# Patient Record
Sex: FEMALE | Race: WHITE | Employment: FULL TIME | URBAN - METROPOLITAN AREA
[De-identification: names, ages, dates, MRNs, and addresses within clinical notes are randomized per-mention and may not be internally consistent; named-entity substitution may affect disease eponyms.]

---

## 2020-06-15 ENCOUNTER — OFFICE VISIT (OUTPATIENT)
Dept: ORTHOPEDIC SURGERY | Age: 50
End: 2020-06-15
Payer: COMMERCIAL

## 2020-06-15 VITALS — BODY MASS INDEX: 21.21 KG/M2 | HEIGHT: 66 IN | TEMPERATURE: 98.8 F | WEIGHT: 132 LBS

## 2020-06-15 PROCEDURE — 99204 OFFICE O/P NEW MOD 45 MIN: CPT | Performed by: ORTHOPAEDIC SURGERY

## 2020-06-15 NOTE — PROGRESS NOTES
phone: None     Gets together: None     Attends Congregational service: None     Active member of club or organization: None     Attends meetings of clubs or organizations: None     Relationship status: None    Intimate partner violence     Fear of current or ex partner: None     Emotionally abused: None     Physically abused: None     Forced sexual activity: None   Other Topics Concern    None   Social History Narrative    None     No current outpatient medications on file. No current facility-administered medications for this visit. No Known Allergies    REVIEW OF SYSTEMS:   No rash  No numbness  No tingling  No fever  No depression      Pertinent items are noted in HPI  Review of systems reviewed from Patient History Form dated on 6/15/2020 and available in the patient's chart under the Media tab. Examination:    General Exam:    Vitals: Temperature 98.8 °F (37.1 °C), height 5' 6\" (1.676 m), weight 132 lb (59.9 kg). BMI Readings from Last 3 Encounters:   06/15/20 21.31 kg/m²     Constitutional: Patient is adequately groomed with no evidence of malnutrition  Mental Status: The patient is oriented to time, place and person. The patient's mood and affect are appropriate. Lymphatic: The lymphatic examination bilaterally reveals all areas to be without enlargement or induration. Vascular: Examination reveals no swelling or calf tenderness. Peripheral pulses are palpable and 2+. Neurological: The patient has good coordination. There is no weakness or sensory deficit. Skin:    Head/Neck: inspection reveals no rashes, ulcerations or lesions. Trunk: inspection reveals no rashes, ulcerations or lesions. Right Upper Extremity: inspection reveals no rashes, ulcerations or lesions. Left Upper Extremity: inspection reveals no rashes, ulcerations or lesions.         PHYSICAL EXAM:      Shoulder Examination  Inspection:  No obvious deformity, no erythema, no periscapular musculature, the trapezius musculature or the pectoralis musculature. Positive Neer's test, positive Cifuentes test, positive pain with crossarm elevation. Gait: normal gait     Reflex: Deep tendon reflexes of the biceps, triceps, brachioradialis +2/4 equal bilaterally. Lower extremity reflexes:  +2/4 and equal bilaterally for patella and Achilles. Contralateral Shoulder exam: Palpation demonstrates no swelling no effusion no pain. There is full active and passive range of motion bilaterally. Strength is excellent with internal rotation against resistance external rotation against resistance supraspinatus isolation against resistance. Shoulder shrug strength is 5 over 5 equal bilaterally. Radial ulnar and median nerve function is intact. Capillary refill is brisk. Elbow motion finger and wrist motion is full equal bilaterally. Deep tendon reflexes of the Brachial radialis, biceps, triceps are all +2/4 equal bilaterally. Cervical spine range of motion is full without pain negative Spurling's test.  Load-and-shift test is negative. Crank test is negative. Apprehension and relocation are negative. Anterior and posterior glide are equal bilaterally. Negative sulcus sign. No signs of any significant multidirectional instability. There is no scapular winging. There is no muscle atrophy of the latissimus dorsi, the deltoid, the periscapular musculature, the trapezius musculature or the pectoralis musculature. Negative Neer's test, negative Cifuentes test, no pain with crossarm elevation. Abduction --150 degrees  Flexion-- 180 degrees  Extension-- between 45-60 degrees  Latera/external rotation --close to 90 degrees  Medial/ internal rotation -- between 70-90 degree    CERVICAL SPINE  EXAMINATION:  · Inspection: Local inspection shows no step-off or bruising. Cervical alignment is normal. No instability is noted. · Palpation and Percussion: No evidence of tenderness at the midline. Examination of the neck does not show any tenderness, deformity or injury. Range of motion is unremarkable. There is no gross instability. There are no rashes, ulcerations or lesions. Strength and tone are normal.        IMPRESSION:    Diagnostic testing:  X-rays reviewed in office by myself, I personally and independently reviewed the films in the office today : I reviewed multiple X-rays today of the right shoulder:  Anterior posterior, lateral, axillary:  Show no fracture, no dislocation, no signs of any masses or tumors, no significant glenohumeral arthritis, no significant A.C. Joint arthritis, good joint space maintenance,    Impression no significant bony abnormality  MRI: MRI demonstrates full-thickness rotator cuff tear with subacromial impingement distal clavicle arthritis  Labs:None at this time. No results found. History reviewed. No pertinent surgical history. .    Office Procedures:  No orders of the defined types were placed in this encounter. Previous Treatments: MRI, physical therapy, injections,  , X-ray, anti-inflammatories,    Differential Diagnoses: Impingement, AC joint osteoarthritis, Rotator cuff tear, Labral tear, Instability, loose body, long head of bicep injury, glenohumeral osteoarthritis, AC joint separation, SLAP tear, Posterior labral tear, Anterior Labral tear, neck pathology, brachioplexis injury, muscle injury, neck radiculopathy, bone tumor, fracture or stress fracture. Diagnosis rotator cuff tear full-thickness right shoulder      Plan (Medical Decision Making):    I discussed the diagnosis and the treatment options with [facility] today. In Summary:  The various treatment options were outlined and discussed with [facility] including:  Conservative care options: physical therapy, ice, medications, bracing, and activity modification. The indications for therapeutic injections. The indications for additional imaging/laboratory studies.   The indications limited to bleeding, infection, spinal headache, nerve injury, increased pain and lack of pain relief. The patient verbalized understanding and would like to proceed. The patient will be scheduled accordingly. 5. Healthy Lifestyle Measures:  Patient education material reviewing the following was distributed to [facility]  Anatomic drawings  Healthy lifestyle education  Osteoporosis prevention,   Back and neck pain educational information   Advanced imaging preparedness    Posture education   Proper lifting and carrying techniques,  Weight management discussed  For further information regarding the spine conditions and to review interventional treatments the patient was directed to [facility].    6.  Follow up:  after surgery    [facility] was instructed to call the office if her symptoms worsen or if new symptoms appear prior to the next scheduled visit. She is specifically instructed to contact the office between now & her scheduled appointment if she has concerns related to her condition or if she needs assistance in scheduling the above tests. She is   welcome to call for an appointment sooner if she has any additional concerns or questions. Ashu Castelan D.O. [facility]. and Sports Medicine  Sports Fellowship Trained  Board Certified  [employer] Team Physician      Disclaimer: \"This note was dictated with voice recognition software. Though review and correction are routine, we apologize for any errors. \"

## 2020-06-16 ENCOUNTER — TELEPHONE (OUTPATIENT)
Dept: ORTHOPEDIC SURGERY | Age: 50
End: 2020-06-16

## 2020-06-16 RX ORDER — UBIDECARENONE 75 MG
50 CAPSULE ORAL DAILY
COMMUNITY

## 2020-06-16 NOTE — PROGRESS NOTES
There is a one visitor policy at [facility] for the pre-op phase only for surgery and endoscopy cases. The visitor is expected to leave the facility after the patient is taken back for the procedure. Pain management is NO VISITOR policyThe patients ride is expected to remain in the car with a cell phone for communication. If the ride is leaving the hospital grounds please make sure they are back in time for pickup. Have the patient inform the staff on arrival what their rides plans are while the patient is in the facility. At the MAIN there is one visitor allowed. Please note that the visitor policy is subject to change.

## 2020-06-16 NOTE — PROGRESS NOTES
Patient instructed to get their COVID-19 test done as directed by their doctor (5-7 days prior to procedure)  or patient states will get on __6/19/2020________. I The day the COVID test is done is considered day one. Instructed to self quarantine after test until DOS.

## 2020-06-16 NOTE — PROGRESS NOTES
take you home after your surgery. You will not be allowed to leave alone or drive yourself home. It is strongly suggested someone stay with you the first 24 hrs. Your surgery will be cancelled if you do not have a ride home. 8. A parent/legal guardian must accompany a child scheduled for surgery and plan to stay at the hospital until the child is discharged. Please do not bring other children with you. 9. Please wear simple, loose fitting clothing to the hospital.  Lynsey Bonilla not bring valuables (money, credit cards, checkbooks, etc.) Do not wear any makeup (including no eye makeup) or nail polish on your fingers or toes. 10. DO NOT wear any jewelry or piercings on day of surgery. All body piercing jewelry must be removed. 11. If you have ___dentures, they will be removed before going to the OR; we will provide you a container. If you wear ___contact lenses or ___glasses, they will be removed; please bring a case for them. 12. Please see your family doctor/pediatrician for a history & physical and/or concerning medications. Bring any test results/reports from your physician's office. PCP__________________Phone___________H&P Appt. Date________             13 If you  have a Living Will and Durable Power of  for Healthcare, please bring in a copy. 15. Notify your Surgeon if you develop any illness between now and surgery  time, cough, cold, fever, sore throat, nausea, vomiting, etc.  Please notify your surgeon if you experience dizziness, shortness of breath or blurred vision between now & the time of your surgery             15. DO NOT shave your operative site 96 hours prior to surgery. For face & neck surgery, men may use an electric razor 48 hours prior to surgery. 16. Shower the night before or morning of surgery using an antibacterial soap or as you have been instructed.              17. To provide excellent care visitors will be limited to one in the room at any given time. 18.  Please bring picture ID and insurance card. 19.  Visit our web site for additional information:  [email or web address]              20.During flu season no children under the age of 15 are permitted in the hospital for the safety of all patients. 21. If you take a long acting insulin in the evening only  take half of your usual  dose the night  before your procedure              22. If you use a c-pap please bring DOS if staying overnight,             23.For your convenience [facility] has a pharmacy on site to fill your prescriptions. 24. If you use oxygen and have a portable tank please bring it  with you the DOS             25. Bring a complete list of all your medications with name and dose include any supplements. 26. Other__________________________________________   *Please call pre admission testing if you any further questions   [address] [place]  [phone]   [address] [place] [address]       All above information reviewed with patient in person or by phone. Patient verbalizes understanding. All questions and concerns addressed.                                                                                                  Patient/Rep____PATIENT________________                                                                                                                                    PRE OP INSTRUCTIONS

## 2020-06-19 ENCOUNTER — OFFICE VISIT (OUTPATIENT)
Dept: PRIMARY CARE CLINIC | Age: 50
End: 2020-06-19
Payer: COMMERCIAL

## 2020-06-19 PROCEDURE — 99211 OFF/OP EST MAY X REQ PHY/QHP: CPT | Performed by: NURSE PRACTITIONER

## 2020-06-19 NOTE — PATIENT INSTRUCTIONS
hands are visibly dirty.   ; Avoid touching: Avoid touching your eyes, nose, and mouth with unwashed hands. Handwashing Tips   ; Wet your hands with clean, running water (warm or cold), turn off the tap, and apply soap.  ; Lather your hands by rubbing them together with the soap. Lather the backs of your hands, between your fingers, and under your nails. ; Scrub your hands for at least 20 seconds. Need a timer? Hum the [place] from beginning to end twice.  ; Rinse your hands well under clean, running water.  ; Dry your hands using a clean towel or air dry them. Avoid sharing personal household items   ; Do not share: You should not share dishes, drinking glasses, cups, eating utensils, towels, or bedding with other people or pets in your home.   ; Wash thoroughly after use: After using these items, they should be washed thoroughly with soap and water. Clean all high-touch surfaces everyday   ; Clean and disinfect: Practice routine cleaning of high touch surfaces.  ; High touch surfaces include counters, tabletops, doorknobs, bathroom fixtures, toilets, phones, keyboards, tablets, and bedside tables.  ; Disinfect areas with bodily fluids: Also, clean any surfaces that may have blood, stool, or body fluids on them.   ; Household : Use a household cleaning spray or wipe, according to the label instructions. Labels contain instructions for safe and effective use of the cleaning product including precautions you should take when applying the product, such as wearing gloves and making sure you have good ventilation during use of the product.     Monitor your symptoms   Seek medical attention: Seek prompt medical attention if your illness is worsening     (e.g., difficulty breathing).   ; Call your doctor: Before seeking care, call your healthcare provider and tell them that you have, or are being evaluated for, COVID-19.   ; Wear a facemask when sick: Put on a facemask before you enter the your e-mail address. You will receive e-mail notification when new information is available in [address] 9. Click Sign Up. You can now view your medical record. Additional Information  If you have questions, please contact your physician practice where you receive care. Remember, MyChart is NOT to be used for urgent needs. For medical emergencies, dial 911.

## 2020-06-20 LAB — SARS-COV-2, PCR: NOT DETECTED

## 2020-06-20 RX ORDER — PROMETHAZINE HYDROCHLORIDE 25 MG/1
12.5 TABLET ORAL EVERY 6 HOURS PRN
Status: CANCELLED | OUTPATIENT
Start: 2020-06-20

## 2020-06-20 RX ORDER — OXYCODONE HYDROCHLORIDE 10 MG/1
10 TABLET ORAL EVERY 6 HOURS PRN
Qty: 21 TABLET | Refills: 0 | Status: SHIPPED | OUTPATIENT
Start: 2020-06-20 | End: 2020-06-27

## 2020-06-20 RX ORDER — MELOXICAM 15 MG/1
15 TABLET ORAL DAILY
Qty: 90 TABLET | Refills: 3 | Status: SHIPPED | OUTPATIENT
Start: 2020-06-20

## 2020-06-20 RX ORDER — ONDANSETRON 2 MG/ML
4 INJECTION INTRAMUSCULAR; INTRAVENOUS EVERY 6 HOURS PRN
Status: CANCELLED | OUTPATIENT
Start: 2020-06-20

## 2020-06-20 RX ORDER — SENNA AND DOCUSATE SODIUM 50; 8.6 MG/1; MG/1
1 TABLET, FILM COATED ORAL 2 TIMES DAILY
Status: CANCELLED | OUTPATIENT
Start: 2020-06-20

## 2020-06-20 RX ORDER — ACETAMINOPHEN 325 MG/1
650 TABLET ORAL EVERY 6 HOURS
Status: CANCELLED | OUTPATIENT
Start: 2020-06-20

## 2020-06-23 ENCOUNTER — HOSPITAL ENCOUNTER (OUTPATIENT)
Age: 50
Setting detail: OUTPATIENT SURGERY
Discharge: HOME | End: 2020-06-23
Attending: ORTHOPAEDIC SURGERY | Admitting: ORTHOPAEDIC SURGERY
Payer: COMMERCIAL

## 2020-06-23 ENCOUNTER — ANESTHESIA (OUTPATIENT)
Dept: OPERATING ROOM | Age: 50
End: 2020-06-23
Payer: COMMERCIAL

## 2020-06-23 ENCOUNTER — ANESTHESIA EVENT (OUTPATIENT)
Dept: OPERATING ROOM | Age: 50
End: 2020-06-23
Payer: COMMERCIAL

## 2020-06-23 VITALS
DIASTOLIC BLOOD PRESSURE: 56 MMHG | OXYGEN SATURATION: 98 % | SYSTOLIC BLOOD PRESSURE: 101 MMHG | WEIGHT: 136 LBS | TEMPERATURE: 97.6 F | BODY MASS INDEX: 21.86 KG/M2 | HEART RATE: 65 BPM | RESPIRATION RATE: 16 BRPM | HEIGHT: 66 IN

## 2020-06-23 VITALS
TEMPERATURE: 95.7 F | OXYGEN SATURATION: 100 % | RESPIRATION RATE: 13 BRPM | DIASTOLIC BLOOD PRESSURE: 51 MMHG | SYSTOLIC BLOOD PRESSURE: 92 MMHG

## 2020-06-23 PROCEDURE — C1762 CONN TISS, HUMAN(INC FASCIA): HCPCS | Performed by: ORTHOPAEDIC SURGERY

## 2020-06-23 PROCEDURE — 2500000003 HC RX 250 WO HCPCS: Performed by: NURSE ANESTHETIST, CERTIFIED REGISTERED

## 2020-06-23 PROCEDURE — 2500000003 HC RX 250 WO HCPCS: Performed by: ORTHOPAEDIC SURGERY

## 2020-06-23 PROCEDURE — 3700000000 HC ANESTHESIA ATTENDED CARE: Performed by: ORTHOPAEDIC SURGERY

## 2020-06-23 PROCEDURE — 2580000003 HC RX 258: Performed by: NURSE ANESTHETIST, CERTIFIED REGISTERED

## 2020-06-23 PROCEDURE — 6360000002 HC RX W HCPCS: Performed by: ANESTHESIOLOGY

## 2020-06-23 PROCEDURE — 6360000002 HC RX W HCPCS: Performed by: ORTHOPAEDIC SURGERY

## 2020-06-23 PROCEDURE — 64415 NJX AA&/STRD BRCH PLXS IMG: CPT | Performed by: ANESTHESIOLOGY

## 2020-06-23 PROCEDURE — 7100000010 HC PHASE II RECOVERY - FIRST 15 MIN: Performed by: ORTHOPAEDIC SURGERY

## 2020-06-23 PROCEDURE — 3600000004 HC SURGERY LEVEL 4 BASE: Performed by: ORTHOPAEDIC SURGERY

## 2020-06-23 PROCEDURE — 7100000000 HC PACU RECOVERY - FIRST 15 MIN: Performed by: ORTHOPAEDIC SURGERY

## 2020-06-23 PROCEDURE — C1713 ANCHOR/SCREW BN/BN,TIS/BN: HCPCS | Performed by: ORTHOPAEDIC SURGERY

## 2020-06-23 PROCEDURE — 2580000003 HC RX 258: Performed by: ORTHOPAEDIC SURGERY

## 2020-06-23 PROCEDURE — 2720000010 HC SURG SUPPLY STERILE: Performed by: ORTHOPAEDIC SURGERY

## 2020-06-23 PROCEDURE — 2709999900 HC NON-CHARGEABLE SUPPLY: Performed by: ORTHOPAEDIC SURGERY

## 2020-06-23 PROCEDURE — 3600000014 HC SURGERY LEVEL 4 ADDTL 15MIN: Performed by: ORTHOPAEDIC SURGERY

## 2020-06-23 PROCEDURE — 6360000002 HC RX W HCPCS: Performed by: NURSE ANESTHETIST, CERTIFIED REGISTERED

## 2020-06-23 PROCEDURE — 7100000011 HC PHASE II RECOVERY - ADDTL 15 MIN: Performed by: ORTHOPAEDIC SURGERY

## 2020-06-23 PROCEDURE — 3700000001 HC ADD 15 MINUTES (ANESTHESIA): Performed by: ORTHOPAEDIC SURGERY

## 2020-06-23 PROCEDURE — L3660 SO 8 AB RSTR CAN/WEB PRE OTS: HCPCS | Performed by: ORTHOPAEDIC SURGERY

## 2020-06-23 PROCEDURE — 7100000001 HC PACU RECOVERY - ADDTL 15 MIN: Performed by: ORTHOPAEDIC SURGERY

## 2020-06-23 PROCEDURE — 2580000003 HC RX 258: Performed by: ANESTHESIOLOGY

## 2020-06-23 DEVICE — IMPLANTABLE DEVICE
Type: IMPLANTABLE DEVICE | Site: SHOULDER | Status: FUNCTIONAL
Brand: BIOINDUCTIVE IMPLANT WITH ARTHROSCOPIC DELIVERY SYSTEM - MEDIUM

## 2020-06-23 DEVICE — BONE ANCHORS 3 WITH ARTHROSCOPIC DELIVERY SYSTEM ADVANCED
Type: IMPLANTABLE DEVICE | Site: SHOULDER | Status: FUNCTIONAL
Brand: BONE ANCHORS WITH ARTHROSCOPIC DELIVERY SYSTEM - ADVANCED

## 2020-06-23 DEVICE — HEALICOIL RG SA 4.75MM W/2 UB-BL                                    CBRD BL
Type: IMPLANTABLE DEVICE | Site: SHOULDER | Status: FUNCTIONAL
Brand: HEALICOIL

## 2020-06-23 DEVICE — Z INACTIVE USE 2600835 ANCHOR TEND 8 FOR REGENETEN BIOINDUCTIVE IMPL SYS: Type: IMPLANTABLE DEVICE | Site: SHOULDER | Status: FUNCTIONAL

## 2020-06-23 DEVICE — MULTIFIX S-ULTRA 5.5MM KNOTLESS ANCHOR
Type: IMPLANTABLE DEVICE | Site: SHOULDER | Status: FUNCTIONAL
Brand: MULTIFIX

## 2020-06-23 RX ORDER — HYDROMORPHONE HCL 110MG/55ML
0.25 PATIENT CONTROLLED ANALGESIA SYRINGE INTRAVENOUS EVERY 5 MIN PRN
Status: DISCONTINUED | OUTPATIENT
Start: 2020-06-23 | End: 2020-06-23 | Stop reason: HOSPADM

## 2020-06-23 RX ORDER — FENTANYL CITRATE 50 UG/ML
100 INJECTION, SOLUTION INTRAMUSCULAR; INTRAVENOUS ONCE
Status: COMPLETED | OUTPATIENT
Start: 2020-06-23 | End: 2020-06-23

## 2020-06-23 RX ORDER — SODIUM CHLORIDE 9 MG/ML
INJECTION, SOLUTION INTRAVENOUS CONTINUOUS PRN
Status: DISCONTINUED | OUTPATIENT
Start: 2020-06-23 | End: 2020-06-23 | Stop reason: SDUPTHER

## 2020-06-23 RX ORDER — SODIUM CHLORIDE 0.9 % (FLUSH) 0.9 %
10 SYRINGE (ML) INJECTION EVERY 12 HOURS SCHEDULED
Status: DISCONTINUED | OUTPATIENT
Start: 2020-06-23 | End: 2020-06-23 | Stop reason: HOSPADM

## 2020-06-23 RX ORDER — FENTANYL CITRATE 50 UG/ML
50 INJECTION, SOLUTION INTRAMUSCULAR; INTRAVENOUS EVERY 5 MIN PRN
Status: DISCONTINUED | OUTPATIENT
Start: 2020-06-23 | End: 2020-06-23 | Stop reason: HOSPADM

## 2020-06-23 RX ORDER — LIDOCAINE HYDROCHLORIDE 10 MG/ML
1 INJECTION, SOLUTION EPIDURAL; INFILTRATION; INTRACAUDAL; PERINEURAL
Status: DISCONTINUED | OUTPATIENT
Start: 2020-06-23 | End: 2020-06-23 | Stop reason: HOSPADM

## 2020-06-23 RX ORDER — ROPIVACAINE HYDROCHLORIDE 5 MG/ML
INJECTION, SOLUTION EPIDURAL; INFILTRATION; PERINEURAL
Status: COMPLETED | OUTPATIENT
Start: 2020-06-23 | End: 2020-06-23

## 2020-06-23 RX ORDER — HYDROCODONE BITARTRATE AND ACETAMINOPHEN 5; 325 MG/1; MG/1
2 TABLET ORAL PRN
Status: DISCONTINUED | OUTPATIENT
Start: 2020-06-23 | End: 2020-06-23 | Stop reason: HOSPADM

## 2020-06-23 RX ORDER — ONDANSETRON 2 MG/ML
INJECTION INTRAMUSCULAR; INTRAVENOUS PRN
Status: DISCONTINUED | OUTPATIENT
Start: 2020-06-23 | End: 2020-06-23 | Stop reason: SDUPTHER

## 2020-06-23 RX ORDER — LIDOCAINE HYDROCHLORIDE 20 MG/ML
INJECTION, SOLUTION INFILTRATION; PERINEURAL PRN
Status: DISCONTINUED | OUTPATIENT
Start: 2020-06-23 | End: 2020-06-23 | Stop reason: SDUPTHER

## 2020-06-23 RX ORDER — MIDAZOLAM HYDROCHLORIDE 1 MG/ML
2 INJECTION INTRAMUSCULAR; INTRAVENOUS ONCE
Status: COMPLETED | OUTPATIENT
Start: 2020-06-23 | End: 2020-06-23

## 2020-06-23 RX ORDER — PROPOFOL 10 MG/ML
INJECTION, EMULSION INTRAVENOUS PRN
Status: DISCONTINUED | OUTPATIENT
Start: 2020-06-23 | End: 2020-06-23 | Stop reason: SDUPTHER

## 2020-06-23 RX ORDER — SODIUM CHLORIDE 9 MG/ML
INJECTION, SOLUTION INTRAVENOUS CONTINUOUS
Status: DISCONTINUED | OUTPATIENT
Start: 2020-06-23 | End: 2020-06-23 | Stop reason: HOSPADM

## 2020-06-23 RX ORDER — HYDROCODONE BITARTRATE AND ACETAMINOPHEN 5; 325 MG/1; MG/1
1 TABLET ORAL PRN
Status: DISCONTINUED | OUTPATIENT
Start: 2020-06-23 | End: 2020-06-23 | Stop reason: HOSPADM

## 2020-06-23 RX ORDER — MEPERIDINE HYDROCHLORIDE 25 MG/ML
12.5 INJECTION INTRAMUSCULAR; INTRAVENOUS; SUBCUTANEOUS EVERY 5 MIN PRN
Status: DISCONTINUED | OUTPATIENT
Start: 2020-06-23 | End: 2020-06-23 | Stop reason: HOSPADM

## 2020-06-23 RX ORDER — DEXAMETHASONE SODIUM PHOSPHATE 4 MG/ML
INJECTION, SOLUTION INTRA-ARTICULAR; INTRALESIONAL; INTRAMUSCULAR; INTRAVENOUS; SOFT TISSUE PRN
Status: DISCONTINUED | OUTPATIENT
Start: 2020-06-23 | End: 2020-06-23 | Stop reason: SDUPTHER

## 2020-06-23 RX ORDER — DIPHENHYDRAMINE HYDROCHLORIDE 50 MG/ML
12.5 INJECTION INTRAMUSCULAR; INTRAVENOUS
Status: DISCONTINUED | OUTPATIENT
Start: 2020-06-23 | End: 2020-06-23 | Stop reason: HOSPADM

## 2020-06-23 RX ORDER — SUCCINYLCHOLINE CHLORIDE 20 MG/ML
INJECTION INTRAMUSCULAR; INTRAVENOUS PRN
Status: DISCONTINUED | OUTPATIENT
Start: 2020-06-23 | End: 2020-06-23 | Stop reason: SDUPTHER

## 2020-06-23 RX ORDER — PROMETHAZINE HYDROCHLORIDE 25 MG/ML
6.25 INJECTION, SOLUTION INTRAMUSCULAR; INTRAVENOUS EVERY 30 MIN PRN
Status: DISCONTINUED | OUTPATIENT
Start: 2020-06-23 | End: 2020-06-23 | Stop reason: HOSPADM

## 2020-06-23 RX ORDER — MEPERIDINE HYDROCHLORIDE 50 MG/ML
INJECTION INTRAMUSCULAR; INTRAVENOUS; SUBCUTANEOUS
Status: DISCONTINUED
Start: 2020-06-23 | End: 2020-06-23 | Stop reason: HOSPADM

## 2020-06-23 RX ORDER — SODIUM CHLORIDE 0.9 % (FLUSH) 0.9 %
10 SYRINGE (ML) INJECTION PRN
Status: DISCONTINUED | OUTPATIENT
Start: 2020-06-23 | End: 2020-06-23 | Stop reason: HOSPADM

## 2020-06-23 RX ORDER — HYDROMORPHONE HCL 110MG/55ML
0.5 PATIENT CONTROLLED ANALGESIA SYRINGE INTRAVENOUS EVERY 5 MIN PRN
Status: DISCONTINUED | OUTPATIENT
Start: 2020-06-23 | End: 2020-06-23 | Stop reason: HOSPADM

## 2020-06-23 RX ORDER — FENTANYL CITRATE 50 UG/ML
25 INJECTION, SOLUTION INTRAMUSCULAR; INTRAVENOUS EVERY 5 MIN PRN
Status: DISCONTINUED | OUTPATIENT
Start: 2020-06-23 | End: 2020-06-23 | Stop reason: HOSPADM

## 2020-06-23 RX ADMIN — PHENYLEPHRINE HYDROCHLORIDE 100 MCG: 10 INJECTION INTRAVENOUS at 07:33

## 2020-06-23 RX ADMIN — CEFAZOLIN SODIUM 2 G: 10 INJECTION, POWDER, FOR SOLUTION INTRAVENOUS at 06:50

## 2020-06-23 RX ADMIN — PHENYLEPHRINE HYDROCHLORIDE 100 MCG: 10 INJECTION INTRAVENOUS at 07:40

## 2020-06-23 RX ADMIN — PHENYLEPHRINE HYDROCHLORIDE 100 MCG: 10 INJECTION INTRAVENOUS at 07:19

## 2020-06-23 RX ADMIN — PHENYLEPHRINE HYDROCHLORIDE 100 MCG: 10 INJECTION INTRAVENOUS at 07:24

## 2020-06-23 RX ADMIN — PHENYLEPHRINE HYDROCHLORIDE 100 MCG: 10 INJECTION INTRAVENOUS at 07:45

## 2020-06-23 RX ADMIN — LIDOCAINE HYDROCHLORIDE 100 MG: 20 INJECTION, SOLUTION INFILTRATION; PERINEURAL at 07:02

## 2020-06-23 RX ADMIN — ROPIVACAINE HYDROCHLORIDE 30 ML: 5 INJECTION, SOLUTION EPIDURAL; INFILTRATION; PERINEURAL at 06:43

## 2020-06-23 RX ADMIN — ONDANSETRON 4 MG: 2 INJECTION INTRAMUSCULAR; INTRAVENOUS at 07:14

## 2020-06-23 RX ADMIN — PHENYLEPHRINE HYDROCHLORIDE 100 MCG: 10 INJECTION INTRAVENOUS at 07:28

## 2020-06-23 RX ADMIN — PHENYLEPHRINE HYDROCHLORIDE 100 MCG: 10 INJECTION INTRAVENOUS at 07:14

## 2020-06-23 RX ADMIN — PHENYLEPHRINE HYDROCHLORIDE 100 MCG: 10 INJECTION INTRAVENOUS at 07:26

## 2020-06-23 RX ADMIN — MIDAZOLAM 2 MG: 1 INJECTION INTRAMUSCULAR; INTRAVENOUS at 06:38

## 2020-06-23 RX ADMIN — FENTANYL CITRATE 100 MCG: 50 INJECTION, SOLUTION INTRAMUSCULAR; INTRAVENOUS at 06:38

## 2020-06-23 RX ADMIN — PHENYLEPHRINE HYDROCHLORIDE 100 MCG: 10 INJECTION INTRAVENOUS at 07:17

## 2020-06-23 RX ADMIN — SODIUM CHLORIDE: 9 INJECTION, SOLUTION INTRAVENOUS at 07:59

## 2020-06-23 RX ADMIN — PROPOFOL 150 MG: 10 INJECTION, EMULSION INTRAVENOUS at 07:02

## 2020-06-23 RX ADMIN — PHENYLEPHRINE HYDROCHLORIDE 100 MCG: 10 INJECTION INTRAVENOUS at 07:22

## 2020-06-23 RX ADMIN — SODIUM CHLORIDE: 9 INJECTION, SOLUTION INTRAVENOUS at 06:24

## 2020-06-23 RX ADMIN — PHENYLEPHRINE HYDROCHLORIDE 100 MCG: 10 INJECTION INTRAVENOUS at 07:37

## 2020-06-23 RX ADMIN — SODIUM CHLORIDE: 9 INJECTION, SOLUTION INTRAVENOUS at 06:58

## 2020-06-23 RX ADMIN — SUCCINYLCHOLINE CHLORIDE 140 MG: 20 INJECTION, SOLUTION INTRAMUSCULAR; INTRAVENOUS at 07:02

## 2020-06-23 RX ADMIN — DEXAMETHASONE SODIUM PHOSPHATE 8 MG: 4 INJECTION, SOLUTION INTRAMUSCULAR; INTRAVENOUS at 07:07

## 2020-06-23 RX ADMIN — MEPERIDINE HYDROCHLORIDE 12.5 MG: 25 INJECTION, SOLUTION INTRAMUSCULAR; INTRAVENOUS; SUBCUTANEOUS at 09:17

## 2020-06-23 ASSESSMENT — PULMONARY FUNCTION TESTS
PIF_VALUE: 0
PIF_VALUE: 18
PIF_VALUE: 12
PIF_VALUE: 18
PIF_VALUE: 12
PIF_VALUE: 18
PIF_VALUE: 8
PIF_VALUE: 18
PIF_VALUE: 12
PIF_VALUE: 17
PIF_VALUE: 10
PIF_VALUE: 12
PIF_VALUE: 18
PIF_VALUE: 18
PIF_VALUE: 1
PIF_VALUE: 18
PIF_VALUE: 17
PIF_VALUE: 18
PIF_VALUE: 17
PIF_VALUE: 17
PIF_VALUE: 18
PIF_VALUE: 18
PIF_VALUE: 11
PIF_VALUE: 18
PIF_VALUE: 17
PIF_VALUE: 16
PIF_VALUE: 18
PIF_VALUE: 17
PIF_VALUE: 18
PIF_VALUE: 9
PIF_VALUE: 16
PIF_VALUE: 18
PIF_VALUE: 3
PIF_VALUE: 10
PIF_VALUE: 18
PIF_VALUE: 17
PIF_VALUE: 11
PIF_VALUE: 4
PIF_VALUE: 18
PIF_VALUE: 18
PIF_VALUE: 17
PIF_VALUE: 18
PIF_VALUE: 18
PIF_VALUE: 12
PIF_VALUE: 12
PIF_VALUE: 1
PIF_VALUE: 17
PIF_VALUE: 17
PIF_VALUE: 13
PIF_VALUE: 18
PIF_VALUE: 1
PIF_VALUE: 3
PIF_VALUE: 17
PIF_VALUE: 18
PIF_VALUE: 18
PIF_VALUE: 8
PIF_VALUE: 18
PIF_VALUE: 10
PIF_VALUE: 18
PIF_VALUE: 18
PIF_VALUE: 17
PIF_VALUE: 18
PIF_VALUE: 16
PIF_VALUE: 18
PIF_VALUE: 11
PIF_VALUE: 18
PIF_VALUE: 13
PIF_VALUE: 18
PIF_VALUE: 17
PIF_VALUE: 18
PIF_VALUE: 12
PIF_VALUE: 17
PIF_VALUE: 18
PIF_VALUE: 16
PIF_VALUE: 1
PIF_VALUE: 16
PIF_VALUE: 18
PIF_VALUE: 18
PIF_VALUE: 17
PIF_VALUE: 18
PIF_VALUE: 12
PIF_VALUE: 3
PIF_VALUE: 18
PIF_VALUE: 10
PIF_VALUE: 18

## 2020-06-23 ASSESSMENT — PAIN - FUNCTIONAL ASSESSMENT: PAIN_FUNCTIONAL_ASSESSMENT: 0-10

## 2020-06-23 ASSESSMENT — PAIN SCALES - GENERAL: PAINLEVEL_OUTOF10: 0

## 2020-06-23 NOTE — PROGRESS NOTES
Received from OR -unresponsive,oral airway,nasal cannula,right shoulder dressing dry and intact,circulation good,sling in place,teds,vss.

## 2020-06-23 NOTE — OP NOTE
Operative Note      Patient: Jose Manuel Thomas  YOB: 1970  MRN: [id number]    Date of Procedure: 6/23/2020    Pre-Op Diagnosis: M75.41  RIGHT SHOULDER IMPINGEMENT  M19.211  DISTAL CLAVICLE ARTHRITIS  M75.101  ROTATOR CUFF TEAR    Post-Op Diagnosis: Right shoulder subacromial impingement, distal clavicle arthritis, massive rotator cuff tear, type I labral tear, synovitis       Procedure(s):  RIGHT SHOULDER ARTHROSCOPE, SUBACROMIAL DECOMPRESSION, DISTAL CLAVICLE EXCISION, ROTATOR CUFF REPAIR AND AUGMETATION AND INDICATED PROCEDURES    Surgeon(s):  Vijay Cano DO    Assistant:   First Assistant: Luis F Zuluaga    Anesthesia: General    Estimated Blood Loss (mL): Minimal    Complications: None    Specimens:   * No specimens in log *    Implants:  * No implants in log *      Drains: * No LDAs found *    Findings: Right shoulder will probably only use subacromial  Comment distal clavicle arthritis, massive rotator cuff tear, synovitis, type I labral tear,    Detailed Description of Procedure:   Right shoulder diagnostic arthroscopy with arthroscopic intra-articular debridement of the labrum and synovium and undersurface rotator cuff tear, followed by subacromial arthroscopy with arthroscopic distal clavicle excision, arthroscopic subacromial decompression, arthroscopic rotator cuff repair with arthroscopic rotator cuff augmentation    Electronically signed by Vijay Cano DO on 6/23/2020 at 6:55 AM            [address], [address] [place] [address]       Operative note by  Danilo Phillips.  Rachna Haro MS,   Orthopedic surgeon  Orthopedics sports Fellowship trained  Board-certified  Team Physician for [facility]                       Shoulder Arthroscopy      Patient Name:  Jose Manuel Thomas    YOB: 1970    Medical  Record number: [id number]    Account number:  [de-identified]    Date Of Surgery: 6/23/2020    Date Of Dictation: 6/23/2020    Location: [facility]

## 2020-06-23 NOTE — ANESTHESIA POSTPROCEDURE EVALUATION
Department of Anesthesiology  Postprocedure Note    Patient: Aissatou Rebolledo  MRN: [id number]  YOB: 1970  Date of evaluation: 6/23/2020  Time:  9:14 AM     Procedure Summary     Date:  06/23/20 Room / Location:  [address]    Anesthesia Start:  0850 Anesthesia Stop:  4410    Procedure:  RIGHT SHOULDER ARTHROSCOPY, SUBACROMIAL DECOMPRESSION, DISTAL CLAVICLE EXCISION, ROTATOR CUFF REPAIR WITH AUGMENTATION, DEBRIDEMENT (Right Shoulder) Diagnosis:       (M75.41  RIGHT SHOULDER IMPINGEMENT)      (M19.211  DISTAL CLAVICLE ARTHRITIS)      (M75.101  ROTATOR CUFF TEAR)    Surgeon:  Wes Simpson DO Responsible Provider:  Navjot Gutiérrez MD    Anesthesia Type:  general, regional ASA Status:  1          Anesthesia Type: general, regional    Aury Phase I: Aury Score: 3    Aury Phase II:      Last vitals: Reviewed and per EMR flowsheets.        Anesthesia Post Evaluation    Patient location during evaluation: PACU  Patient participation: complete - patient participated  Level of consciousness: awake and alert  Pain score: 2  Airway patency: patent  Nausea & Vomiting: no vomiting  Complications: no  Cardiovascular status: blood pressure returned to baseline  Respiratory status: acceptable  Hydration status: euvolemic

## 2020-06-29 ENCOUNTER — OFFICE VISIT (OUTPATIENT)
Dept: ORTHOPEDIC SURGERY | Age: 50
End: 2020-06-29

## 2020-06-29 ENCOUNTER — HOSPITAL ENCOUNTER (OUTPATIENT)
Dept: PHYSICAL THERAPY | Age: 50
Setting detail: THERAPIES SERIES
Discharge: HOME | End: 2020-06-29
Payer: COMMERCIAL

## 2020-06-29 PROCEDURE — 97140 MANUAL THERAPY 1/> REGIONS: CPT

## 2020-06-29 PROCEDURE — 99024 POSTOP FOLLOW-UP VISIT: CPT | Performed by: ORTHOPAEDIC SURGERY

## 2020-06-29 PROCEDURE — 97161 PT EVAL LOW COMPLEX 20 MIN: CPT

## 2020-06-29 NOTE — PLAN OF CARE
intake and observation. Intake form has been scanned into medical record. Patient has been instructed to contact their primary care physician regarding ROS issues if not already being addressed at this time. Co-morbidities/Complexities (which will affect course of rehabilitation):    [x]None           Arthritic conditions   []Rheumatoid arthritis (M05.9)  []Osteoarthritis (M19.91)   Cardiovascular conditions   []Hypertension (I10)  []Hyperlipidemia (E78.5)  []Angina pectoris (I20)  []Atherosclerosis (I70)   Musculoskeletal conditions   []Disc pathology   []Congenital spine pathologies   []Prior surgical intervention  []Osteoporosis (M81.8)  []Osteopenia (M85.8)   Endocrine conditions   []Hypothyroid (E03.9)  []Hyperthyroid Gastrointestinal conditions   []Constipation (M35.89)   Metabolic conditions   []Morbid obesity (E66.01)  []Diabetes type 1(E10.65) or 2 (E11.65)   []Neuropathy (G60.9)     Pulmonary conditions   []Asthma (J45)  []Coughing   []COPD (J44.9)   Psychological Disorders  []Anxiety (F41.9)  []Depression (F32.9)   []Other:   []Other:          Barriers to/and or personal factors that will affect rehab potential:              []Age  []Sex              []Motivation/Lack of Motivation                        []Co-Morbidities              []Cognitive Function, education/learning barriers              []Environmental, home barriers              []profession/work barriers  []past PT/medical experience  []other:  Justification:       Falls Risk Assessment (30 days):    [x] Falls Risk assessed and no intervention required. [] Falls Risk assessed and Patient requires intervention due to being higher risk   TUG score (>12s at risk):     [] Falls education provided, including        ASSESSMENT: Patient presents today s/p R RC repair. ROM and pain are appropriate for 1 week post op. Discussed healing process and plan for rehab. Patient verbalized agreement and understanding.   Functional Impairments   []Noted which may benefit from Dry needling     []other:     Prognosis/Rehab Potential:      [x]Excellent   []Good    []Fair   []Poor    Tolerance of evaluation/treatment:    [x]Excellent   []Good    []Fair   []Poor    Physical Therapy Evaluation Complexity Justification  [x] A history of present problem with:  [x] no personal factors and/or comorbidities that impact the plan of care;  []1-2 personal factors and/or comorbidities that impact the plan of care  []3 personal factors and/or comorbidities that impact the plan of care  [x] An examination of body systems using standardized tests and measures addressing any of the following: body structures and functions (impairments), activity limitations, and/or participation restrictions;:  [x] a total of 1-2 or more elements   [] a total of 3 or more elements   [] a total of 4 or more elements   [x] A clinical presentation with:  [x] stable and/or uncomplicated characteristics   [] evolving clinical presentation with changing characteristics  [] unstable and unpredictable characteristics;   [x] Clinical decision making of [x] low, [] moderate, [] high complexity using standardized patient assessment instrument and/or measurable assessment of functional outcome. [x] EVAL (LOW) 39671 (typically 30 minutes face-to-face)  [] EVAL (MOD) 87203 (typically 30 minutes face-to-face)  [] EVAL (HIGH) 07720 (typically 45 minutes face-to-face)  [] RE-EVAL     PLAN:  Frequency/Duration:  2 days per week for 12 Weeks:  INTERVENTIONS:  [x] Therapeutic exercise including: strength training, ROM, for Upper extremity and core   [x]  NMR activation and proprioception for UE, scap and Core   [x] Manual therapy as indicated for shoulder, scapula and spine to include: Dry Needling/IASTM, STM, PROM, Gr I-IV mobilizations, manipulation.    [x] Modalities as needed that may include: thermal agents, E-stim, Biofeedback, US, iontophoresis as indicated  [] Patient education on joint protection, postural

## 2020-07-02 ENCOUNTER — HOSPITAL ENCOUNTER (OUTPATIENT)
Dept: PHYSICAL THERAPY | Age: 50
Setting detail: THERAPIES SERIES
Discharge: HOME | End: 2020-07-02
Payer: COMMERCIAL

## 2020-07-02 PROCEDURE — 97140 MANUAL THERAPY 1/> REGIONS: CPT | Performed by: SPECIALIST/TECHNOLOGIST

## 2020-07-02 PROCEDURE — 97016 VASOPNEUMATIC DEVICE THERAPY: CPT | Performed by: SPECIALIST/TECHNOLOGIST

## 2020-07-02 PROCEDURE — 97110 THERAPEUTIC EXERCISES: CPT | Performed by: SPECIALIST/TECHNOLOGIST

## 2020-07-02 NOTE — FLOWSHEET NOTE
[employer]    Physical Therapy Treatment Note/ Progress Report:     Date:  2020    Patient Name:  Sabiha Flowers    :  1970  MRN: [id number]  Medical/Treatment Diagnosis Information:    Diagnosis: M75.121 (ICD-10-CM) - Nontraumatic complete tear of right rotator cuff             Treatment Diagnosis: M75.121 (ICD-10-CM) - Nontraumatic complete tear of right rotator cuff            Insurance/Certification information:     Physician Information:   [id number] signed (Y/N):     Date of Patient follow up with Physician:  Jonathan Ruelas  8/3     Progress Report: []  Yes  []  No     Functional Scale:    Date:    Date Range for reporting period:  Beginnin30  Ending:       Progress report due (10 Rx/or 30 days whichever is less):       Recertification due (POC duration/ or 90 days whichever is less):       Visit # Insurance Allowable Auth Needed   2 Med nec []Yes    []No     Pain level:  0-/10 Denies pain    SUBJECTIVE:  Rt shoulder doing well and not wearing the sling except for at night. Stopped pain meds but taking Aleve to sleep. Sleeping in bed but with lots of pillows.  HEP and icing 4x/day  Working out at the gym([employer]) but not doing anything to aggravate her Rt shoulder    OBJECTIVE:    Observation:   20 Moderate ecchymosis to Bicep  And into the chest wall   Test measurements:      RESTRICTIONS/PRECAUTIONS: R RC repair 20  Exercises/Interventions:   Therapeutic Ex 20' Wt / Resistance Sets/sec Reps Notes   Pend, scap retraction, elbow ROM 5 min   Squeezing blue putty with elbow flexion   Table slides flexion  10\" 10x     Seated ER with Cane  15\" 5x                                                                                                                    Manual Intervention 15'       Shld /GH Mobs       Post Cap mobs 3'      Thoracic/Rib manipulation       CT MT/Mobs       PROM MT 12 min      STM to     Bicep/ anterior arm          NMR re-education                                                                   Therapeutic Exercise and NMR EXR  [x] (43347) Provided verbal/tactile cueing for activities related to strengthening, flexibility, endurance, ROM  for improvements in scapular, scapulothoracic and UE control with self care, reaching, carrying, lifting, house/yardwork, driving/computer work.    [] (53276) Provided verbal/tactile cueing for activities related to improving balance, coordination, kinesthetic sense, posture, motor skill, proprioception  to assist with  scapular, scapulothoracic and UE control with self care, reaching, carrying, lifting, house/yardwork, driving/computer work. Therapeutic Activities:    [] (86044 or 38528) Provided verbal/tactile cueing for activities related to improving balance, coordination, kinesthetic sense, posture, motor skill, proprioception and motor activation to allow for proper function of scapular, scapulothoracic and UE control with self care, carrying, lifting, driving/computer work.      Home Exercise Program:    [x] (34823) Reviewed/Progressed HEP activities related to strengthening, flexibility, endurance, ROM of scapular, scapulothoracic and UE control with self care, reaching, carrying, lifting, house/yardwork, driving/computer work  [] (09901) Reviewed/Progressed HEP activities related to improving balance, coordination, kinesthetic sense, posture, motor skill, proprioception of scapular, scapulothoracic and UE control with self care, reaching, carrying, lifting, house/yardwork, driving/computer work      Manual Treatments:  PROM / STM / Oscillations-Mobs:  G-I, II, III, IV (PA's, Inf., Post.)  [x] (93608) Provided manual therapy to mobilize soft tissue/joints of cervical/CT, scapular GHJ and UE for the purpose of modulating pain, promoting relaxation,  increasing ROM, reducing/eliminating soft tissue swelling/inflammation/restriction, improving soft tissue extensibility and allowing for proper ROM for normal function with self care, reaching, carrying, lifting, house/yardwork, driving/computer work    Modalities:  Vaso for post op swelling and  Moderate edema/  15'    Charges:  Timed Code Treatment Minutes: 35'   Total Treatment Minutes: 50       [] EVAL (LOW) 27273 (typically 20 minutes face-to-face)  [] EVAL (MOD) 70136 (typically 30 minutes face-to-face)  [] EVAL (HIGH) 43056 (typically 45 minutes face-to-face)  [] RE-EVAL     [x] ZO(44848) x   1  [] IONTO (95749)  [] NMR (13561) x     [x] VASO (84608)  [x] Manual (52474) x  1   [] Other:  [] TA (52224)x     [] Mech Traction (44664)  [] ES(attended) (56586)     [] ES (un) (97725): If BWC Please Indicate Time In/Out  CPT Code Time in Time out                                     GOALS:    GOALS:  Patient stated goal: return to exercise  [] Progressing: [] Met: [] Not Met: [] Adjusted    Therapist goals for Patient:   Short Term Goals: To be achieved in: 2 weeks  1. Independent in HEP and progression per patient tolerance, in order to prevent re-injury. [] Progressing: [] Met: [] Not Met: [] Adjusted  2. Patient will have a decrease in pain to facilitate improvement in movement, function, and ADLs as indicated by Functional Deficits. [] Progressing: [] Met: [] Not Met: [] Adjusted    Long Term Goals: To be achieved in: 12 weeks  1. Disability index score of 10% or less for the DASH to assist with reaching prior level of function. [] Progressing: [] Met: [] Not Met: [] Adjusted  2. Patient will demonstrate increased AROM to [facility] to allow for proper joint functioning as indicated by patients Functional Deficits. [] Progressing: [] Met: [] Not Met: [] Adjusted  3. Patient will demonstrate an increase in Strength to [facility] to allow for proper functional mobility as indicated by patients Functional Deficits. [] Progressing: [] Met: [] Not Met: [] Adjusted  4.  Patient will return to exercising functional activities without increased symptoms or restriction. [] Progressing: [] Met: [] Not Met: [] Adjusted  5. No pain with sleeping(patient specific functional goal)    [] Progressing: [] Met: [] Not Met: [] Adjusted     Progression Towards Functional goals:  [] Patient is progressing as expected towards functional goals listed. [] Progression is slowed due to complexities listed. [] Progression has been slowed due to co-morbidities. [x] Plan just implemented, too soon to assess goals progression  [] Other:     ASSESSMENT:  Pt had excellent PROM within protocol ranges and has excellent GH mobilty today. Focus was on beginning gentle AAROM with CANE ER and flexion table slides. Denied pain with progressions. Pt. has a good understanding of the healing process and body mechanics with isometrics and movement patterns. Pt was educated on joint protection with not wearing sling except for sleeping. Pt agreed and understands precautions but chooses to not wear sling due to hot temperatures/ rash. Return to Play: (if applicable)   []  Stage 1: Intro to Strength   []  Stage 2: Dynamic Strength and Intro to Plyometrics   []  Stage 3: Advanced Plyometrics and Intro to Throwing   []  Stage 4: Sport specific Training/Return to Sport     []  Ready to Return to Play, [employer]   []  Not Ready for Return to Sports   Comments:      Treatment/Activity Tolerance:  [x] Patient tolerated treatment well [] Patient limited by fatique  [] Patient limited by pain  [] Patient limited by other medical complications  [] Other:     Overall Progression Towards Functional goals/ Treatment Progress Update:  [] Patient is progressing as expected towards functional goals listed. [] Progression is slowed due to complexities/Impairments listed. [] Progression has been slowed due to co-morbidities.   [x] Plan just implemented, too soon to assess goals progression <30days   [] Goals require adjustment due to lack of progress  [] Patient is

## 2020-07-06 ENCOUNTER — HOSPITAL ENCOUNTER (OUTPATIENT)
Dept: PHYSICAL THERAPY | Age: 50
Setting detail: THERAPIES SERIES
Discharge: HOME | End: 2020-07-06
Payer: COMMERCIAL

## 2020-07-06 PROCEDURE — 97016 VASOPNEUMATIC DEVICE THERAPY: CPT

## 2020-07-06 PROCEDURE — 97110 THERAPEUTIC EXERCISES: CPT

## 2020-07-06 PROCEDURE — 97140 MANUAL THERAPY 1/> REGIONS: CPT

## 2020-07-06 NOTE — FLOWSHEET NOTE
[facility]    Physical Therapy Treatment Note/ Progress Report:     Date:  2020    Patient Name:  Marjorie Green    :  1970  MRN: [id number]  Medical/Treatment Diagnosis Information:    Diagnosis: M75.121 (ICD-10-CM) - Nontraumatic complete tear of right rotator cuff             Treatment Diagnosis: M75.121 (ICD-10-CM) - Nontraumatic complete tear of right rotator cuff            Insurance/Certification information:     Physician Information:   [id number] [facility] signed (Y/N):     Date of Patient follow up with Physician:  Tierney Lind  8/3     Progress Report: []  Yes  []  No     Functional Scale:    Date:    Date Range for reporting period:  Beginnin30  Ending:       Progress report due (10 Rx/or 30 days whichever is less):       Recertification due (POC duration/ or 90 days whichever is less):       Visit # Insurance Allowable Auth Needed   3 Med nec []Yes    []No     Pain level:  0-/10 Denies pain    SUBJECTIVE:  Rt shoulder doing well and not wearing the sling except for at night. No pain after last treatment. Mild bruise on medial bicep region.       OBJECTIVE:    Observation:   20 Moderate ecchymosis to Bicep  And into the chest wall   Test measurements:      RESTRICTIONS/PRECAUTIONS: R RC repair 20  Exercises/Interventions:   Therapeutic Ex 20' Wt / Resistance Sets/sec Reps Notes   Pend, scap retraction, elbow ROM 5 min   Squeezing blue putty with elbow flexion   Table slides flexion  10\" 10x     Seated ER with Cane  15\" 5x                                                                                                                    Manual Intervention 15'       Shld /GH Mobs       Post Cap mobs 3'      Thoracic/Rib manipulation       CT MT/Mobs       PROM MT 15 min      STM to     Bicep/ anterior arm          NMR re-education                                                                   Therapeutic Exercise and NMR EXR  [x] (80694) Provided verbal/tactile cueing for activities related to strengthening, flexibility, endurance, ROM  for improvements in scapular, scapulothoracic and UE control with self care, reaching, carrying, lifting, house/yardwork, driving/computer work.    [] (80499) Provided verbal/tactile cueing for activities related to improving balance, coordination, kinesthetic sense, posture, motor skill, proprioception  to assist with  scapular, scapulothoracic and UE control with self care, reaching, carrying, lifting, house/yardwork, driving/computer work. Therapeutic Activities:    [] (16843 or 09068) Provided verbal/tactile cueing for activities related to improving balance, coordination, kinesthetic sense, posture, motor skill, proprioception and motor activation to allow for proper function of scapular, scapulothoracic and UE control with self care, carrying, lifting, driving/computer work.      Home Exercise Program:    [x] (00547) Reviewed/Progressed HEP activities related to strengthening, flexibility, endurance, ROM of scapular, scapulothoracic and UE control with self care, reaching, carrying, lifting, house/yardwork, driving/computer work  [] (24684) Reviewed/Progressed HEP activities related to improving balance, coordination, kinesthetic sense, posture, motor skill, proprioception of scapular, scapulothoracic and UE control with self care, reaching, carrying, lifting, house/yardwork, driving/computer work      Manual Treatments:  PROM / STM / Oscillations-Mobs:  G-I, II, III, IV (PA's, Inf., Post.)  [x] (02949) Provided manual therapy to mobilize soft tissue/joints of cervical/CT, scapular GHJ and UE for the purpose of modulating pain, promoting relaxation,  increasing ROM, reducing/eliminating soft tissue swelling/inflammation/restriction, improving soft tissue extensibility and allowing for proper ROM for normal function with self care, reaching, carrying, lifting, house/yardwork, driving/computer work    Modalities:  Vaso for post op swelling and  Moderate edema/  15'    Charges:  Timed Code Treatment Minutes: 35'   Total Treatment Minutes: 50       [] EVAL (LOW) 80089 (typically 20 minutes face-to-face)  [] EVAL (MOD) 85763 (typically 30 minutes face-to-face)  [] EVAL (HIGH) 68820 (typically 45 minutes face-to-face)  [] RE-EVAL     [x] UW(90457) x   1  [] IONTO (05760)  [] NMR (84570) x     [x] VASO (81565)  [x] Manual (31892) x  1   [] Other:  [] TA (85714)x     [] Mech Traction (87712)  [] ES(attended) (32034)     [] ES (un) (88392): If BWC Please Indicate Time In/Out  CPT Code Time in Time out                                     GOALS:    GOALS:  Patient stated goal: return to exercise  [] Progressing: [] Met: [] Not Met: [] Adjusted    Therapist goals for Patient:   Short Term Goals: To be achieved in: 2 weeks  1. Independent in HEP and progression per patient tolerance, in order to prevent re-injury. [] Progressing: [] Met: [] Not Met: [] Adjusted  2. Patient will have a decrease in pain to facilitate improvement in movement, function, and ADLs as indicated by Functional Deficits. [] Progressing: [] Met: [] Not Met: [] Adjusted    Long Term Goals: To be achieved in: 12 weeks  1. Disability index score of 10% or less for the DASH to assist with reaching prior level of function. [] Progressing: [] Met: [] Not Met: [] Adjusted  2. Patient will demonstrate increased AROM to [facility] to allow for proper joint functioning as indicated by patients Functional Deficits. [] Progressing: [] Met: [] Not Met: [] Adjusted  3. Patient will demonstrate an increase in [facility]rength to [facility] to allow for proper functional mobility as indicated by patients Functional Deficits. [] Progressing: [] Met: [] Not Met: [] Adjusted  4. Patient will return to exercising functional activities without increased symptoms or restriction. [] Progressing: [] Met: [] Not Met: [] Adjusted  5.  No pain with sleeping(patient specific functional goal)    [] Progressing: [] Met: [] Not Met: [] Adjusted     Progression Towards Functional goals:  [] Patient is progressing as expected towards functional goals listed. [] Progression is slowed due to complexities listed. [] Progression has been slowed due to co-morbidities. [x] Plan just implemented, too soon to assess goals progression  [] Other:     ASSESSMENT:  Pt had excellent PROM within protocol ranges and has excellent GH mobilty today. Doing well. Follow up 1 x next week. Again, discussed the need to utilize sling due to healing of cuff and precautions. Return to Play: (if applicable)   []  Stage 1: Intro to Strength   []  Stage 2: Dynamic Strength and Intro to Plyometrics   []  Stage 3: Advanced Plyometrics and Intro to Throwing   []  Stage 4: Sport specific Training/Return to Sport     []  Ready to Return to Play, [employer]   []  Not Ready for Return to Sports   Comments:      Treatment/Activity Tolerance:  [x] Patient tolerated treatment well [] Patient limited by fatique  [] Patient limited by pain  [] Patient limited by other medical complications  [] Other:     Overall Progression Towards Functional goals/ Treatment Progress Update:  [] Patient is progressing as expected towards functional goals listed. [] Progression is slowed due to complexities/Impairments listed. [] Progression has been slowed due to co-morbidities.   [x] Plan just implemented, too soon to assess goals progression <30days   [] Goals require adjustment due to lack of progress  [] Patient is not progressing as expected and requires additional follow up with physician  [] Other    Prognosis for POC: [x] Good [] Fair  [] Poor    Patient requires continued skilled intervention: [x] Yes  [] No      PLAN:    [x] Continue per plan of care [] Alter current plan (see comments)  [] Plan of care initiated [] Hold pending MD visit [] Discharge    Electronically signed by: Wilbert Casanova Antonella Days, [address] [facility], [id number]    Note: If patient does not return for scheduled/recommended follow up visits, this note will serve as a discharge from care along with the most recent update on progress.

## 2020-07-10 ENCOUNTER — APPOINTMENT (OUTPATIENT)
Dept: PHYSICAL THERAPY | Age: 50
End: 2020-07-10
Payer: COMMERCIAL

## 2020-07-13 ENCOUNTER — HOSPITAL ENCOUNTER (OUTPATIENT)
Dept: PHYSICAL THERAPY | Age: 50
Setting detail: THERAPIES SERIES
Discharge: HOME | End: 2020-07-13
Payer: COMMERCIAL

## 2020-07-13 PROCEDURE — 97140 MANUAL THERAPY 1/> REGIONS: CPT

## 2020-07-13 NOTE — FLOWSHEET NOTE
[facility] [employer]    Physical Therapy Treatment Note/ Progress Report:     Date:  2020    Patient Name:  Dylan Fraser    :  1970  MRN: [id number]  Medical/Treatment Diagnosis Information:    Diagnosis: M75.121 (ICD-10-CM) - Nontraumatic complete tear of right rotator cuff             Treatment Diagnosis: M75.121 (ICD-10-CM) - Nontraumatic complete tear of right rotator cuff            Insurance/Certification information:     Physician Information:   [id number] signed (Y/N):     Date of Patient follow up with Physician:  Delilah Walters  8/3     Progress Report: []  Yes  []  No     Functional Scale:    Date:    Date Range for reporting period:  Beginnin30  Ending:       Progress report due (10 Rx/or 30 days whichever is less):       Recertification due (POC duration/ or 90 days whichever is less):       Visit # Insurance Allowable Auth Needed   4 Med nec []Yes    []No     Pain level:  0-/10 Denies pain    SUBJECTIVE:  Rt shoulder doing well and not wearing the sling except for at night. No pain after last treatment.   Felt a pop last week in shoulder, no increased pain      OBJECTIVE:    Observation:   20 Moderate ecchymosis to Bicep  And into the chest wall   Test measurements:      RESTRICTIONS/PRECAUTIONS: R RC repair 20  Exercises/Interventions:   Therapeutic Ex5' Wt / Resistance Sets/sec Reps Notes   Pend, scap retraction, elbow ROM 5 min   Squeezing blue putty with elbow flexion   Table slides flexion  10\" 10x     Seated ER with Cane  15\" 5x                                                                                                                    Manual [id number]'       Shld /GH Mobs       Post Cap mobs 5'      Thoracic/Rib manipulation       CT MT/Mobs       PROM MT 20 min      STM to     Bicep/ anterior arm          NMR re-education Therapeutic Exercise and NMR EXR  [x] (55429) Provided verbal/tactile cueing for activities related to strengthening, flexibility, endurance, ROM  for improvements in scapular, scapulothoracic and UE control with self care, reaching, carrying, lifting, house/yardwork, driving/computer work.    [] (19921) Provided verbal/tactile cueing for activities related to improving balance, coordination, kinesthetic sense, posture, motor skill, proprioception  to assist with  scapular, scapulothoracic and UE control with self care, reaching, carrying, lifting, house/yardwork, driving/computer work. Therapeutic Activities:    [] (29796 or 63294) Provided verbal/tactile cueing for activities related to improving balance, coordination, kinesthetic sense, posture, motor skill, proprioception and motor activation to allow for proper function of scapular, scapulothoracic and UE control with self care, carrying, lifting, driving/computer work.      Home Exercise Program:    [x] (25347) Reviewed/Progressed HEP activities related to strengthening, flexibility, endurance, ROM of scapular, scapulothoracic and UE control with self care, reaching, carrying, lifting, house/yardwork, driving/computer work  [] (23068) Reviewed/Progressed HEP activities related to improving balance, coordination, kinesthetic sense, posture, motor skill, proprioception of scapular, scapulothoracic and UE control with self care, reaching, carrying, lifting, house/yardwork, driving/computer work      Manual Treatments:  PROM / STM / Oscillations-Mobs:  G-I, II, III, IV (PA's, Inf., Post.)  [x] (50304) Provided manual therapy to mobilize soft tissue/joints of cervical/CT, scapular GHJ and UE for the purpose of modulating pain, promoting relaxation,  increasing ROM, reducing/eliminating soft tissue swelling/inflammation/restriction, improving soft tissue extensibility and allowing for proper ROM for normal function with self care, reaching, carrying, No pain with sleeping(patient specific functional goal)    [] Progressing: [] Met: [] Not Met: [] Adjusted     Progression Towards Functional goals:  [] Patient is progressing as expected towards functional goals listed. [] Progression is slowed due to complexities listed. [] Progression has been slowed due to co-morbidities. [x] Plan just implemented, too soon to assess goals progression  [] Other:     ASSESSMENT:  Pt had excellent PROM within protocol ranges and has excellent GH mobilty today. Doing well. Large tear that was repaired,  Progress next week to AAROM progression. Return to Play: (if applicable)   []  Stage 1: Intro to Strength   []  Stage 2: Dynamic Strength and Intro to Plyometrics   []  Stage 3: Advanced Plyometrics and Intro to Throwing   []  Stage 4: Sport specific Training/Return to Sport     []  Ready to Return to Play, [employer]   []  Not Ready for Return to Sports   Comments:      Treatment/Activity Tolerance:  [x] Patient tolerated treatment well [] Patient limited by fatique  [] Patient limited by pain  [] Patient limited by other medical complications  [] Other:     Overall Progression Towards Functional goals/ Treatment Progress Update:  [] Patient is progressing as expected towards functional goals listed. [] Progression is slowed due to complexities/Impairments listed. [] Progression has been slowed due to co-morbidities.   [x] Plan just implemented, too soon to assess goals progression <30days   [] Goals require adjustment due to lack of progress  [] Patient is not progressing as expected and requires additional follow up with physician  [] Other    Prognosis for POC: [x] Good [] Fair  [] Poor    Patient requires continued skilled intervention: [x] Yes  [] No      PLAN:    [x] Continue per plan of care [] Alter current plan (see comments)  [] Plan of care initiated [] Hold pending MD visit [] Discharge    Electronically signed by: Yo Lilly, PT,

## 2020-07-20 ENCOUNTER — HOSPITAL ENCOUNTER (OUTPATIENT)
Dept: PHYSICAL THERAPY | Age: 50
Setting detail: THERAPIES SERIES
Discharge: HOME | End: 2020-07-20
Payer: COMMERCIAL

## 2020-07-20 PROCEDURE — 97110 THERAPEUTIC EXERCISES: CPT

## 2020-07-20 PROCEDURE — 97140 MANUAL THERAPY 1/> REGIONS: CPT

## 2020-07-20 NOTE — FLOWSHEET NOTE
[facility]    Physical Therapy Treatment Note/ Progress Report:     Date:  2020    Patient Name:  Polo Hernandez    :  1970  MRN: [id number]  Medical/Treatment Diagnosis Information:    Diagnosis: M75.121 (ICD-10-CM) - Nontraumatic complete tear of right rotator cuff             Treatment Diagnosis: M75.121 (ICD-10-CM) - Nontraumatic complete tear of right rotator cuff            Insurance/Certification information:     Physician Information:   Deepa Coburn of care signed (Y/N):     Date of Patient follow up with Physician:  Harmeet Magaña  8/3     Progress Report: []  Yes  []  No     Functional Scale:    Date:    Date Range for reporting period:  Beginnin30  Ending:       Progress report due (10 Rx/or 30 days whichever is less):       Recertification due (POC duration/ or 90 days whichever is less):       Visit # Insurance Allowable Auth Needed   5 Med nec []Yes    []No     Pain level:  0-/10 Denies pain    SUBJECTIVE:  Rt shoulder doing well and not wearing the sling except for at night. No pain .     OBJECTIVE:    Observation:   20 Moderate ecchymosis to Bicep  And into the chest wall   Test measurements:      RESTRICTIONS/PRECAUTIONS: R RC repair 20  Exercises/Interventions:   Therapeutic Ex5' Wt / Resistance Sets/sec Reps Notes   Pend, scap retraction, elbow ROM 5 min   Squeezing blue putty with elbow flexion   Table slides flexion  10\" 10x     Seated ER with Cane  15\" 5x           Wand flexion  2 10           Isometrics  Flex/ext/ER/IR 8 sec 10                                                                                        Manual [id number]'       Shld /GH Mobs       Post Cap mobs 5'      Thoracic/Rib manipulation       CT MT/Mobs       PROM MT 10 min      STM to     Bicep/ anterior arm          NMR re-education                                                                   Therapeutic Exercise and NMR EXR  [x] (93992) Provided verbal/tactile cueing for activities related to strengthening, flexibility, endurance, ROM  for improvements in scapular, scapulothoracic and UE control with self care, reaching, carrying, lifting, house/yardwork, driving/computer work.    [] (51278) Provided verbal/tactile cueing for activities related to improving balance, coordination, kinesthetic sense, posture, motor skill, proprioception  to assist with  scapular, scapulothoracic and UE control with self care, reaching, carrying, lifting, house/yardwork, driving/computer work. Therapeutic Activities:    [] (89611 or 43662) Provided verbal/tactile cueing for activities related to improving balance, coordination, kinesthetic sense, posture, motor skill, proprioception and motor activation to allow for proper function of scapular, scapulothoracic and UE control with self care, carrying, lifting, driving/computer work.      Home Exercise Program:    [x] (42804) Reviewed/Progressed HEP activities related to strengthening, flexibility, endurance, ROM of scapular, scapulothoracic and UE control with self care, reaching, carrying, lifting, house/yardwork, driving/computer work  [] (76072) Reviewed/Progressed HEP activities related to improving balance, coordination, kinesthetic sense, posture, motor skill, proprioception of scapular, scapulothoracic and UE control with self care, reaching, carrying, lifting, house/yardwork, driving/computer work      Manual Treatments:  PROM / STM / Oscillations-Mobs:  G-I, II, III, IV (PA's, Inf., Post.)  [x] (90217) Provided manual therapy to mobilize soft tissue/joints of cervical/CT, scapular GHJ and UE for the purpose of modulating pain, promoting relaxation,  increasing ROM, reducing/eliminating soft tissue swelling/inflammation/restriction, improving soft tissue extensibility and allowing for proper ROM for normal function with self care, reaching, carrying, lifting, house/yardwork, driving/computer functional goal)    [] Progressing: [] Met: [] Not Met: [] Adjusted     Progression Towards Functional goals:  [] Patient is progressing as expected towards functional goals listed. [] Progression is slowed due to complexities listed. [] Progression has been slowed due to co-morbidities. [x] Plan just implemented, too soon to assess goals progression  [] Other:     ASSESSMENT:  Pt had excellent PROM within protocol ranges and has excellent GH mobilty today. Doing well. Good tolerance to activity today and added sub max iso and active assist flexion. Return to Play: (if applicable)   []  Stage 1: Intro to Strength   []  Stage 2: Dynamic Strength and Intro to Plyometrics   []  Stage 3: Advanced Plyometrics and Intro to Throwing   []  Stage 4: Sport specific Training/Return to Sport     []  Ready to Return to Play, [employer]   []  Not Ready for Return to Sports   Comments:      Treatment/Activity Tolerance:  [x] Patient tolerated treatment well [] Patient limited by fatique  [] Patient limited by pain  [] Patient limited by other medical complications  [] Other:     Overall Progression Towards Functional goals/ Treatment Progress Update:  [x] Patient is progressing as expected towards functional goals listed. [] Progression is slowed due to complexities/Impairments listed. [] Progression has been slowed due to co-morbidities.   [] Plan just implemented, too soon to assess goals progression <30days   [] Goals require adjustment due to lack of progress  [] Patient is not progressing as expected and requires additional follow up with physician  [] Other    Prognosis for POC: [x] Good [] Fair  [] Poor    Patient requires continued skilled intervention: [x] Yes  [] No      PLAN:    [x] Continue per plan of care [] Alter current plan (see comments)  [] Plan of care initiated [] Hold pending MD visit [] Discharge    Electronically signed by: [place], [id number]    Note: If patient does not return for scheduled/recommended follow up visits, this note will serve as a discharge from care along with the most recent update on progress.

## 2020-07-27 ENCOUNTER — OFFICE VISIT (OUTPATIENT)
Dept: ORTHOPEDIC SURGERY | Age: 50
End: 2020-07-27

## 2020-07-27 ENCOUNTER — HOSPITAL ENCOUNTER (OUTPATIENT)
Dept: PHYSICAL THERAPY | Age: 50
Setting detail: THERAPIES SERIES
Discharge: HOME | End: 2020-07-27
Payer: COMMERCIAL

## 2020-07-27 PROBLEM — M75.121 NONTRAUMATIC COMPLETE TEAR OF RIGHT ROTATOR CUFF: Status: ACTIVE | Noted: 2020-07-27

## 2020-07-27 PROCEDURE — 97140 MANUAL THERAPY 1/> REGIONS: CPT

## 2020-07-27 PROCEDURE — 97110 THERAPEUTIC EXERCISES: CPT

## 2020-07-27 PROCEDURE — 99024 POSTOP FOLLOW-UP VISIT: CPT | Performed by: ORTHOPAEDIC SURGERY

## 2020-07-27 NOTE — PROGRESS NOTES
7/27/20  History of Present of Illness:  S/P Rotator Cuff Repair  The patient returns today for right shoulder evaluation 5 weeks after 6/23/2020 rotator cuff repair    Examination:  Inspection reveals warm, dry, intact skin. There is no adenopathy. The distal neurovascular exam is grossly intact. Examination of the contralateral shoulder reveals no atrophy or deformity. The skin is warm and dry. Range of motion is within normal limits. There is no focal tenderness with palpation. Provocative SLAP, biceps tension, apprehension AC joint or rotator cuff tests are negative. Strength is graded 5/5 in all muscle groups outside of the rotator cuff. Rotator cuff strength is strong and intact even at 5 weeks. The distal neurovascular exam is grossly intact. Cervical spine: The skin is warm and dry. There is no swelling, warmth, or erythema. Range of motion is within normal limits. There is no paraspinal or spinous process tenderness. Spurling's sign is negative and did not produce shoulder pain. The distal neurovascular exam is grossly intact. Diagnostic Test Findings:    No orders of the defined types were placed in this encounter. Treatment Plan: At this point she is really wanting to do more I gave her some exercises to do she can do modified tricep extensions modified preacher curls and modified seated rows no bench no overhead work she still needs to sleep in a sling for 1 more week then I like to see her back in 2 to 3 weeks at which point we will get her back doing more      Mariea Millington. BHARGAVI Castelan. [address] [place], [place] [address]y 20 and Sports Medicine  Sports Fellowship Trained  Board Certified  [employer] Team Physician    Disclaimer: \"This note was dictated with voice recognition software. Though review and correction are routine, we apologize for any errors. \"

## 2020-07-31 ENCOUNTER — HOSPITAL ENCOUNTER (OUTPATIENT)
Dept: PHYSICAL THERAPY | Age: 50
Setting detail: THERAPIES SERIES
Discharge: HOME | End: 2020-07-31
Payer: COMMERCIAL

## 2020-07-31 PROCEDURE — 97140 MANUAL THERAPY 1/> REGIONS: CPT

## 2020-07-31 PROCEDURE — 97530 THERAPEUTIC ACTIVITIES: CPT

## 2020-07-31 NOTE — FLOWSHEET NOTE
[facility]    Physical Therapy Treatment Note/ Progress Report:     Date:  2020    Patient Name:  Alana Gibson    :  1970  MRN: [id number]  Medical/Treatment Diagnosis Information:    Diagnosis: M75.121 (ICD-10-CM) - Nontraumatic complete tear of right rotator cuff             Treatment Diagnosis: M75.121 (ICD-10-CM) - Nontraumatic complete tear of right rotator cuff            Insurance/Certification information:     Physician Information:   [id number] [facility] signed (Y/N):     Date of Patient follow up with Physician:  Jos Colvin  8/3     Progress Report: []  Yes  []  No     Functional Scale:    Date:    Date Range for reporting period:  Beginnin30  Ending:       Progress report due (10 Rx/or 30 days whichever is less):       Recertification due (POC duration/ or 90 days whichever is less):       Visit # Insurance Allowable Auth Needed   7 Med nec []Yes    []No     Pain level:  0-/10 Denies pain    SUBJECTIVE:  Rt shoulder doing well. Need to leave early today.       OBJECTIVE:    Observation:   20 Moderate ecchymosis to Bicep  And into the chest wall   Test measurements:      RESTRICTIONS/PRECAUTIONS: R RC repair 20  Exercises/Interventions:   Therapeutic Ex5' Wt / Resistance Sets/sec Reps Notes   Pend, scap retraction, elbow ROM 5 min   Squeezing blue putty with elbow flexion   Table slides flexion  10\" 10x     Seated ER with Cane  15\" 5x           Wand flexion  2 10           Isometrics  Flex/ext/ER/IR 8 sec 10    Prone shoulder ext  2 12           Row scap retraction blue 2 12           ER to neutral red 2 12 Added to HEP next week          IR st with strap  20 sec 3 Added to HEP next week                                      Manual [id number]'       Shld /GH Mobs       Post Cap mobs 5'      Thoracic/Rib manipulation       CT MT/Mobs       PROM MT 10 min      STM to     Bicep/ anterior arm          NMR care initiated [] Hold pending MD visit [] Discharge    Electronically signed [place]: [place], [id number]    Note: If patient does not return for scheduled/recommended follow up visits, this note will serve as a discharge from care along with the most recent update on progress.

## 2020-08-10 ENCOUNTER — HOSPITAL ENCOUNTER (OUTPATIENT)
Dept: PHYSICAL THERAPY | Age: 50
Setting detail: THERAPIES SERIES
Discharge: HOME | End: 2020-08-10
Payer: COMMERCIAL

## 2020-08-10 PROCEDURE — 97110 THERAPEUTIC EXERCISES: CPT

## 2020-08-10 NOTE — FLOWSHEET NOTE
[facility]    Physical Therapy Treatment Note/ Progress Report:     Date:  8/10/2020    Patient Name:  Alana Gibson    :  1970  MRN: [id number]  Medical/Treatment Diagnosis Information:    Diagnosis: M75.121 (ICD-10-CM) - Nontraumatic complete tear of right rotator cuff             Treatment Diagnosis: M75.121 (ICD-10-CM) - Nontraumatic complete tear of right rotator cuff            Insurance/Certification information:     Physician Information:   [id number] [facility] signed (Y/N):     Date of Patient follow up with Physician:  Jos Colvin  8/3     Progress Report: []  Yes  []  No     Functional Scale:    Date:    Date Range for reporting period:  Beginnin30  Ending:       Progress report due (10 Rx/or 30 days whichever is less):       Recertification due (POC duration/ or 90 days whichever is less):       Visit # Insurance Allowable Auth Needed   8 Med nec []Yes    []No     Pain level:  0-/10 Denies pain    SUBJECTIVE:  Rt shoulder doing well. No problems with HEP. Shoulder sore with sleeping on R side at times.       OBJECTIVE:    Observation:   20 Moderate ecchymosis to Bicep  And into the chest wall   Test measurements:      RESTRICTIONS/PRECAUTIONS: R RC repair 20  Exercises/Interventions:   Therapeutic Ex40' Wt / Resistance Sets/sec Reps Notes   Pend, scap retraction, elbow ROM 5 min   Squeezing blue putty with elbow flexion   Table slides flexion      Seated ER with Cane            Wand flexion  2 10           Isometrics  Flex/ext/ER/IR 8 sec 10                   Row scap retraction blue 2 12           ER to neutral red 2 12     Side ER 0-1 lb 2 12    IR st with strap  20 sec 3     Prone ext  2 lb 2 12    Prone HAB 1/2 0 lb 1 10    Wall slide 0 lb                    Manual Intervention 5'       Shld /GH Mobs       Post Cap mobs 5'      Thoracic/Rib manipulation       CT MT/Mobs       PROM MT 10 min      STM to     Bicep/ anterior arm          NMR re-education                                                                   Therapeutic Exercise and NMR EXR  [x] (25781) Provided verbal/tactile cueing for activities related to strengthening, flexibility, endurance, ROM  for improvements in scapular, scapulothoracic and UE control with self care, reaching, carrying, lifting, house/yardwork, driving/computer work.    [] (16705) Provided verbal/tactile cueing for activities related to improving balance, coordination, kinesthetic sense, posture, motor skill, proprioception  to assist with  scapular, scapulothoracic and UE control with self care, reaching, carrying, lifting, house/yardwork, driving/computer work. Therapeutic Activities:    [] (07225 or 98745) Provided verbal/tactile cueing for activities related to improving balance, coordination, kinesthetic sense, posture, motor skill, proprioception and motor activation to allow for proper function of scapular, scapulothoracic and UE control with self care, carrying, lifting, driving/computer work.      Home Exercise Program:    [x] (91082) Reviewed/Progressed HEP activities related to strengthening, flexibility, endurance, ROM of scapular, scapulothoracic and UE control with self care, reaching, carrying, lifting, house/yardwork, driving/computer work  [] (45305) Reviewed/Progressed HEP activities related to improving balance, coordination, kinesthetic sense, posture, motor skill, proprioception of scapular, scapulothoracic and UE control with self care, reaching, carrying, lifting, house/yardwork, driving/computer work      Manual Treatments:  PROM / STM / Oscillations-Mobs:  G-I, II, III, IV (PA's, Inf., Post.)  [x] (84446) Provided manual therapy to mobilize soft tissue/joints of cervical/CT, scapular GHJ and UE for the purpose of modulating pain, promoting relaxation,  increasing ROM, reducing/eliminating soft tissue swelling/inflammation/restriction, improving soft tissue extensibility and allowing for proper ROM for normal function with self care, reaching, carrying, lifting, house/yardwork, driving/computer work    Modalities:  Vaso for post op swelling and  Moderate edema/  15'    Charges:  Timed Code Treatment Minutes: 45'   Total Treatment Minutes: 45       [] EVAL (LOW) 91704 (typically 20 minutes face-to-face)  [] EVAL (MOD) 26182 (typically 30 minutes face-to-face)  [] EVAL (HIGH) 37480 (typically 45 minutes face-to-face)  [] RE-EVAL     [x] BE(25585) x   3  [] IONTO (40540)  [] NMR (80187) x     [x] VASO (11455)  [] Manual (95047) x     [] Other:  [] TA (09859)x     [] Mech Traction (34791)  [] ES(attended) (58476)     [] ES (un) (15078): If BWC Please Indicate Time In/Out  CPT Code Time in Time out                                     GOALS:    GOALS:  Patient stated goal: return to exercise  [] Progressing: [] Met: [] Not Met: [] Adjusted    Therapist goals for Patient:   Short Term Goals: To be achieved in: 2 weeks  1. Independent in HEP and progression per patient tolerance, in order to prevent re-injury. [] Progressing: [] Met: [] Not Met: [] Adjusted  2. Patient will have a decrease in pain to facilitate improvement in movement, function, and ADLs as indicated by Functional Deficits. [] Progressing: [] Met: [] Not Met: [] Adjusted    Long Term Goals: To be achieved in: 12 weeks  1. Disability index score of 10% or less for the DASH to assist with reaching prior level of function. [] Progressing: [] Met: [] Not Met: [] Adjusted  2. Patient will demonstrate increased AROM to [facility] [place] to allow for proper joint functioning as indicated by patients Functional Deficits. [] Progressing: [] Met: [] Not Met: [] Adjusted  3. Patient will demonstrate an increase in Strength to [facility] to allow for proper functional mobility as indicated by patients Functional Deficits. [] Progressing: [] Met: [] Not Met: [] Adjusted  4.  Patient will return to exercising functional activities without increased symptoms or restriction. [] Progressing: [] Met: [] Not Met: [] Adjusted  5. No pain with sleeping(patient specific functional goal)    [] Progressing: [] Met: [] Not Met: [] Adjusted     Progression Towards Functional goals:  [x] Patient is progressing as expected towards functional goals listed. [] Progression is slowed due to complexities listed. [] Progression has been slowed due to co-morbidities. [x] Plan just implemented, too soon to assess goals progression  [] Other:     ASSESSMENT:  Pt had excellent PROM within protocol ranges and has excellent GH mobilty today. Doing well. Cont to progress as rebecca  Return to Play: (if applicable)   []  Stage 1: Intro to Strength   []  Stage 2: Dynamic Strength and Intro to Plyometrics   []  Stage 3: Advanced Plyometrics and Intro to Throwing   []  Stage 4: Sport specific Training/Return to Sport     []  Ready to Return to Play, [employer]   []  Not Ready for Return to Sports   Comments:      Treatment/Activity Tolerance:  [x] Patient tolerated treatment well [] Patient limited by fatique  [] Patient limited by pain  [] Patient limited by other medical complications  [] Other:     Overall Progression Towards Functional goals/ Treatment Progress Update:  [x] Patient is progressing as expected towards functional goals listed. [] Progression is slowed due to complexities/Impairments listed. [] Progression has been slowed due to co-morbidities.   [] Plan just implemented, too soon to assess goals progression <30days   [] Goals require adjustment due to lack of progress  [] Patient is not progressing as expected and requires additional follow up with physician  [] Other    Prognosis for POC: [x] Good [] Fair  [] Poor    Patient requires continued skilled intervention: [x] Yes  [] No      PLAN:    [x] Continue per plan of care [] Alter current plan (see comments)  [] Plan of care initiated [] Hold pending MD visit []

## 2020-08-17 ENCOUNTER — HOSPITAL ENCOUNTER (OUTPATIENT)
Dept: PHYSICAL THERAPY | Age: 50
Setting detail: THERAPIES SERIES
Discharge: HOME | End: 2020-08-17
Payer: COMMERCIAL

## 2020-08-17 PROCEDURE — 97110 THERAPEUTIC EXERCISES: CPT

## 2020-08-17 PROCEDURE — 97140 MANUAL THERAPY 1/> REGIONS: CPT

## 2020-08-17 NOTE — FLOWSHEET NOTE
[facility]    Physical Therapy Treatment Note/ Progress Report:     Date:  2020    Patient Name:  Jessie Barbour    :  1970  MRN: [id number]  Medical/Treatment Diagnosis Information:    Diagnosis: M75.121 (ICD-10-CM) - Nontraumatic complete tear of right rotator cuff             Treatment Diagnosis: M75.121 (ICD-10-CM) - Nontraumatic complete tear of right rotator cuff            Insurance/Certification information:     Physician Information:   [id number] [facility] signed (Y/N):     Date of Patient follow up with Physician:  Meaghan Triana  8/3     Progress Report: []  Yes  []  No     Functional Scale:    Date:    Date Range for reporting period:  Beginnin30  Ending:       Progress report due (10 Rx/or 30 days whichever is less):       Recertification due (POC duration/ or 90 days whichever is less):       Visit # Insurance Allowable Auth Needed   9 Med nec []Yes    []No     Pain level:  0-/10 Denies pain    SUBJECTIVE:  Rt shoulder doing well. No pain or swelling. Stretching behind back is the hardest thing I do.   .      OBJECTIVE:    Observation:   20 Moderate ecchymosis to Bicep  And into the chest wall   Test measurements:      RESTRICTIONS/PRECAUTIONS: R RC repair 20  Exercises/Interventions:   Therapeutic Ex40' Wt / Resistance Sets/sec Reps Notes   Pend, scap retraction, elbow ROM 5 min   Squeezing blue putty with elbow flexion   Table slides flexion      Seated ER with Cane            Wand flexion  2 10           Isometrics                    Row scap retraction blue 2 12    Sup Serr ant 4 lb 1 20    ER to 20deg red 2 12     Side ER 2 lb 2 12    IR st with strap  20 sec 3     Prone ext  3-4 lb 2 12    Prone HAB 1/2 1-2 lb 1 10    Wall slide 0 lb      Ball circles  1 20           Manual Intervention 10'       Shld /GH Mobs       Post Cap mobs 0'      Thoracic/Rib manipulation       CT MT/Mobs       PROM MT 10 min   End range flexion and IR   STM to     Bicep/ anterior arm          NMR re-education                                                                   Therapeutic Exercise and NMR EXR  [x] (40388) Provided verbal/tactile cueing for activities related to strengthening, flexibility, endurance, ROM  for improvements in scapular, scapulothoracic and UE control with self care, reaching, carrying, lifting, house/yardwork, driving/computer work.    [] (28696) Provided verbal/tactile cueing for activities related to improving balance, coordination, kinesthetic sense, posture, motor skill, proprioception  to assist with  scapular, scapulothoracic and UE control with self care, reaching, carrying, lifting, house/yardwork, driving/computer work. Therapeutic Activities:    [] (09730 or 94466) Provided verbal/tactile cueing for activities related to improving balance, coordination, kinesthetic sense, posture, motor skill, proprioception and motor activation to allow for proper function of scapular, scapulothoracic and UE control with self care, carrying, lifting, driving/computer work.      Home Exercise Program:    [x] (55529) Reviewed/Progressed HEP activities related to strengthening, flexibility, endurance, ROM of scapular, scapulothoracic and UE control with self care, reaching, carrying, lifting, house/yardwork, driving/computer work  [] (15108) Reviewed/Progressed HEP activities related to improving balance, coordination, kinesthetic sense, posture, motor skill, proprioception of scapular, scapulothoracic and UE control with self care, reaching, carrying, lifting, house/yardwork, driving/computer work      Manual Treatments:  PROM / STM / Oscillations-Mobs:  G-I, II, III, IV (PA's, Inf., Post.)  [x] (82057) Provided manual therapy to mobilize soft tissue/joints of cervical/CT, scapular GHJ and UE for the purpose of modulating pain, promoting relaxation,  increasing ROM, reducing/eliminating soft tissue swelling/inflammation/restriction, improving soft tissue extensibility and allowing for proper ROM for normal function with self care, reaching, carrying, lifting, house/yardwork, driving/computer work    Modalities:      Charges:  Timed Code Treatment Minutes: 45'   Total Treatment Minutes: 45       [] EVAL (LOW) 24380 (typically 20 minutes face-to-face)  [] EVAL (MOD) 00439 (typically 30 minutes face-to-face)  [] EVAL (HIGH) 09012 (typically 45 minutes face-to-face)  [] RE-EVAL     [x] FU(22475) x   2  [] IONTO (71222)  [] NMR (09817) x     [] VASO (08646)  [x] Manual (05547) x1     [] Other:  [] TA (72880)x     [] Mech Traction (88353)  [] ES(attended) (00532)     [] ES (un) (80133): If BWC Please Indicate Time In/Out  CPT Code Time in Time out                                     GOALS:    GOALS:  Patient stated goal: return to exercise  [] Progressing: [] Met: [] Not Met: [] Adjusted    Therapist goals for Patient:   Short Term Goals: To be achieved in: 2 weeks  1. Independent in HEP and progression per patient tolerance, in order to prevent re-injury. [x] Progressing: [] Met: [] Not Met: [] Adjusted  2. Patient will have a decrease in pain to facilitate improvement in movement, function, and ADLs as indicated by Functional Deficits. [x] Progressing: [] Met: [] Not Met: [] Adjusted    Long Term Goals: To be achieved in: 12 weeks  1. Disability index score of 10% or less for the DASH to assist with reaching prior level of function. [x] Progressing: [] Met: [] Not Met: [] Adjusted  2. Patient will demonstrate increased AROM to [facility] [place] to allow for proper joint functioning as indicated by patients Functional Deficits. [x] Progressing: [] Met: [] Not Met: [] Adjusted  3. Patient will demonstrate an increase in Strength to [facility] [place] to allow for proper functional mobility as indicated by patients Functional Deficits. [x] Progressing: [] Met: [] Not Met: [] Adjusted  4.  Patient will return to exercising functional comments)  [] Plan of care initiated [] Hold pending MD visit [] Discharge    Electronically signed by: Belem Arshad PT,      Note: If patient does not return for scheduled/recommended follow up visits, this note will serve as a discharge from care along with the most recent update on progress.

## 2020-08-24 ENCOUNTER — OFFICE VISIT (OUTPATIENT)
Dept: ORTHOPEDIC SURGERY | Age: 50
End: 2020-08-24

## 2020-08-24 ENCOUNTER — HOSPITAL ENCOUNTER (OUTPATIENT)
Dept: PHYSICAL THERAPY | Age: 50
Setting detail: THERAPIES SERIES
Discharge: HOME | End: 2020-08-24
Payer: COMMERCIAL

## 2020-08-24 VITALS — WEIGHT: 136 LBS | HEIGHT: 66 IN | BODY MASS INDEX: 21.86 KG/M2

## 2020-08-24 PROCEDURE — 97110 THERAPEUTIC EXERCISES: CPT

## 2020-08-24 PROCEDURE — 99024 POSTOP FOLLOW-UP VISIT: CPT | Performed by: ORTHOPAEDIC SURGERY

## 2020-08-24 PROCEDURE — 97140 MANUAL THERAPY 1/> REGIONS: CPT

## 2020-08-24 NOTE — PLAN OF CARE
Information:   Flip  Plan of care signed (Y/N):     Date of Patient follow up with Physician:  Betsy Latham  8/3     Progress Report: []  Yes  []  No     Functional Scale:    Date:    Date Range for reporting period:  Beginnin30  Ending:       Progress report due (10 Rx/or 30 days whichever is less):       Recertification due (POC duration/ or 90 days whichever is less):       Visit # Insurance Allowable Auth Needed   10 Med nec []Yes    []No     Pain level:  0-/10 Denies pain    SUBJECTIVE:  Rt shoulder doing well. No pain or swelling. Stretching behind back is the hardest thing I do.   .      OBJECTIVE:    Observation:   20 Moderate ecchymosis to Bicep  And into the chest wall   Test measurements:      ROM Left Right   Shoulder Flex 180 170   Shoulder Abd 170 170   Shoulder ER 90 90   Shoulder IR 50 40                           Strength  Left Right   Shoulder Flex  17 lb 5 lb   Shoulder Scap 18 lb 5 lb   Shoulder ER 21 lb  10 lb   Shoulder IR 24 lb 15 lb           RESTRICTIONS/PRECAUTIONS: R RC repair 20  Exercises/Interventions:   Therapeutic Ex40' Wt / Resistance Sets/sec Reps Notes   Pend, scap retraction, elbow ROM 5 min   Squeezing blue putty with elbow flexion   Table slides flexion      Seated ER with Cane            Wand flexion  2 10           Isometrics                    Row scap retraction blue 2 12    Sup Serr ant 4 lb 1 20    ER to 20deg red 2 12     Side ER 2 lb 2 12    IR st with strap  20 sec 3     Prone ext  3-4 lb 2 12    Prone HAB 1/2 1-2 lb 1 10    Wall slide 0 lb      Ball circles  1 20           Manual Intervention 10'       Shld /GH Mobs       Post Cap mobs 0'      Thoracic/Rib manipulation       CT MT/Mobs       PROM MT 10 min   End range flexion and IR   STM to     Bicep/ anterior arm          NMR re-education                                                                   Therapeutic Exercise and NMR EXR  [x] (47952) Provided verbal/tactile cueing for activities related to strengthening, flexibility, endurance, ROM  for improvements in scapular, scapulothoracic and UE control with self care, reaching, carrying, lifting, house/yardwork, driving/computer work.    [] (63400) Provided verbal/tactile cueing for activities related to improving balance, coordination, kinesthetic sense, posture, motor skill, proprioception  to assist with  scapular, scapulothoracic and UE control with self care, reaching, carrying, lifting, house/yardwork, driving/computer work. Therapeutic Activities:    [] (57267 or 89858) Provided verbal/tactile cueing for activities related to improving balance, coordination, kinesthetic sense, posture, motor skill, proprioception and motor activation to allow for proper function of scapular, scapulothoracic and UE control with self care, carrying, lifting, driving/computer work.      Home Exercise Program:    [x] (80503) Reviewed/Progressed HEP activities related to strengthening, flexibility, endurance, ROM of scapular, scapulothoracic and UE control with self care, reaching, carrying, lifting, house/yardwork, driving/computer work  [] (76350) Reviewed/Progressed HEP activities related to improving balance, coordination, kinesthetic sense, posture, motor skill, proprioception of scapular, scapulothoracic and UE control with self care, reaching, carrying, lifting, house/yardwork, driving/computer work      Manual Treatments:  PROM / STM / Oscillations-Mobs:  G-I, II, III, IV (PA's, Inf., Post.)  [x] (54659) Provided manual therapy to mobilize soft tissue/joints of cervical/CT, scapular GHJ and UE for the purpose of modulating pain, promoting relaxation,  increasing ROM, reducing/eliminating soft tissue swelling/inflammation/restriction, improving soft tissue extensibility and allowing for proper ROM for normal function with self care, reaching, carrying, lifting, house/yardwork, driving/computer work    Modalities:      Charges:  Timed Code Treatment Minutes: 30' Total Treatment Minutes: 30       [] EVAL (LOW) 59900 (typically 20 minutes face-to-face)  [] EVAL (MOD) 13895 (typically 30 minutes face-to-face)  [] EVAL (HIGH) 68118 (typically 45 minutes face-to-face)  [] RE-EVAL     [x] KM(15858) x   1  [] IONTO (87784)  [] NMR (91808) x     [] VASO (35901)  [x] Manual (19972) x1     [] Other:  [] TA (37464)x     [] Mech Traction (74616)  [] ES(attended) (45540)     [] ES (un) (14720): If [facility] Please Indicate Time In/Out  CPT Code Time in Time out                                     GOALS:    GOALS:  Patient stated goal: return to exercise  [] Progressing: [x] Met: [] Not Met: [] Adjusted    Therapist goals for Patient:   Short Term Goals: To be achieved in: 2 weeks  1. Independent in HEP and progression per patient tolerance, in order to prevent re-injury. [] Progressing: [x] Met: [] Not Met: [] Adjusted  2. Patient will have a decrease in pain to facilitate improvement in movement, function, and ADLs as indicated by Functional Deficits. [] Progressing: [x] Met: [] Not Met: [] Adjusted    Long Term Goals: To be achieved in: 12 weeks  1. Disability index score of 10% or less for the DASH to assist with reaching prior level of function. [x] Progressing: [] Met: [] Not Met: [] Adjusted  2. Patient will demonstrate increased AROM to [employer] to allow for proper joint functioning as indicated by patients Functional Deficits. [] Progressing: [x] Met: [] Not Met: [] Adjusted  3. Patient will demonstrate an increase in Strength to [employer] to allow for proper functional mobility as indicated by patients Functional Deficits. [x] Progressing: [] Met: [] Not Met: [] Adjusted  4. Patient will return to exercising functional activities without increased symptoms or restriction. [x] Progressing: [] Met: [] Not Met: [] Adjusted  5.  No pain with sleeping(patient specific functional goal)    [] Progressing: [x] Met: [] Not Met: [] Adjusted     Progression Towards Functional goals:  [x] Patient is progressing as expected towards functional goals listed. [] Progression is slowed due to complexities listed. [] Progression has been slowed due to co-morbidities. [] Plan just implemented, too soon to assess goals progression  [] Other:     ASSESSMENT:  Pt had excellent PROM within protocol ranges and has excellent GH mobilty today. Already had peformed routine prior to attending therapy. Added prone HAB to HEP. Follow up in 2 weeks    Return to Play: (if applicable)   []  Stage 1: Intro to Strength   []  Stage 2: Dynamic Strength and Intro to Plyometrics   []  Stage 3: Advanced Plyometrics and Intro to Throwing   []  Stage 4: Sport specific Training/Return to Sport     []  Ready to Return to Play, [employer]   []  Not Ready for Return to Sports   Comments:      Treatment/Activity Tolerance:  [x] Patient tolerated treatment well [] Patient limited by fatique  [] Patient limited by pain  [] Patient limited by other medical complications  [] Other:     Overall Progression Towards Functional goals/ Treatment Progress Update:  [x] Patient is progressing as expected towards functional goals listed. [] Progression is slowed due to complexities/Impairments listed. [] Progression has been slowed due to co-morbidities.   [] Plan just implemented, too soon to assess goals progression <30days   [] Goals require adjustment due to lack of progress  [] Patient is not progressing as expected and requires additional follow up with physician  [] Other    Prognosis for POC: [x] Good [] Fair  [] Poor    Patient requires continued skilled intervention: [x] Yes  [] No      PLAN:  Cont 1-2 x per week up to 5 weeks  [x] Continue per plan of care [] Alter current plan (see comments)  [] Plan of care initiated [] Hold pending MD visit [] Discharge    Electronically signed by: Betsy Beckett PT,      Note: If patient does not return for scheduled/recommended follow up visits, this note will serve as a discharge from care along with the most recent update on progress.

## 2020-08-24 NOTE — PROGRESS NOTES
8/24/20  History of Present of Illness:  S/P Rotator Cuff Repair  The patient returns today for right shoulder evaluation 9 weeks after 6/23/2020 rotator cuff repair    Examination:  Inspection reveals warm, dry, intact skin. There is no adenopathy. The distal neurovascular exam is grossly intact. Examination of the contralateral shoulder reveals no atrophy or deformity. The skin is warm and dry. Range of motion is within normal limits. There is no focal tenderness with palpation. Provocative SLAP, biceps tension, apprehension AC joint or rotator cuff tests are negative. Strength is graded 5/5 in all muscle groups outside of the rotator cuff. Rotator cuff strength is 5. The distal neurovascular exam is grossly intact. Cervical spine: The skin is warm and dry. There is no swelling, warmth, or erythema. Range of motion is within normal limits. There is no paraspinal or spinous process tenderness. Spurling's sign is negative and did not produce shoulder pain. The distal neurovascular exam is grossly intact. Diagnostic Test Findings:    No orders of the defined types were placed in this encounter. Treatment Plan:  Continue to do physical therapy I will let her do deadlifts with the bar she can start doing seated rows and continue physical therapy with a plan to start increasing her weight training in about 6 weeks I want to see her back here in 4 to 6 weeks      Kelli Lovell. BHARGAVI Castelan. [address] [place] [address] and Sports Medicine  Sports Fellowship Trained  Board Certified  [facility] Team Physician    Disclaimer: \"This note was dictated with voice recognition software. Though review and correction are routine, we apologize for any errors. \"

## 2020-09-04 ENCOUNTER — APPOINTMENT (OUTPATIENT)
Dept: PHYSICAL THERAPY | Age: 50
End: 2020-09-04
Payer: COMMERCIAL

## 2020-09-11 ENCOUNTER — APPOINTMENT (OUTPATIENT)
Dept: PHYSICAL THERAPY | Age: 50
End: 2020-09-11
Payer: COMMERCIAL

## 2020-09-14 ENCOUNTER — HOSPITAL ENCOUNTER (OUTPATIENT)
Dept: PHYSICAL THERAPY | Age: 50
Setting detail: THERAPIES SERIES
Discharge: HOME | End: 2020-09-14
Payer: COMMERCIAL

## 2020-09-14 PROCEDURE — 97110 THERAPEUTIC EXERCISES: CPT

## 2020-09-14 PROCEDURE — 97140 MANUAL THERAPY 1/> REGIONS: CPT

## 2020-09-14 NOTE — FLOWSHEET NOTE
[facility]            Physical Therapy Treatment Note/ Progress Report:     Date:  2020    Patient Name:  Major Moe    :  1970  MRN: [id number]  Medical/Treatment Diagnosis Information:    Diagnosis: M75.121 (ICD-10-CM) - Nontraumatic complete tear of right rotator cuff             Treatment Diagnosis: M75.121 (ICD-10-CM) - Nontraumatic complete tear of right rotator cuff            Insurance/Certification information:     Physician Information:   [id number] signed (Y/N):     Date of Patient follow up with Physician:  Alisa Godinez  8/3     Progress Report: []  Yes  []  No     Functional Scale:    Date:    Date Range for reporting period:  Beginnin30  Ending:       Progress report due (10 Rx/or 30 days whichever is less):       Recertification due (POC duration/ or 90 days whichever is less):       Visit # Insurance Allowable Auth Needed   11 Med nec []Yes    []No     Pain level:  0-/10 Denies pain    SUBJECTIVE:  Rt shoulder doing well. No pain or swelling. Stretching behind back is the hardest thing I do.   .      OBJECTIVE:    Observation:   20 Moderate ecchymosis to Bicep  And into the chest wall   Test measurements:      ROM Left Right   Shoulder Flex 180 170   Shoulder Abd 170 170   Shoulder ER 90 90   Shoulder IR 50 40                           Strength  Left Right   Shoulder Flex  17 lb 5 lb   Shoulder Scap 18 lb 5 lb   Shoulder ER 21 lb  10 lb 12 lb   Shoulder IR 24 lb 15 lb 19 lb           RESTRICTIONS/PRECAUTIONS: R RC repair 20  Exercises/Interventions:   Therapeutic Ex40' Wt / Resistance Sets/sec Reps Notes   Pend, scap retraction, elbow ROM 5 min   Squeezing blue putty with elbow flexion   Table slides flexion      Seated ER with Cane            Wand flexion  2 10           Isometrics                    Row scap retraction blue 2 12    Sup Serr ant 4 lb 1 20    ER to 20deg red 2 12     Sidely punch 2lb  2 8    Side ER 3-5 lb 2 12    IR st with strap  20 sec 3     Prone 90/90 0 lb 1 15    Prone HAB  1-2 lb 2 10    Wall slide 0 lb      Ball circles  1 20    Tall kneeling 90/690 2 lb 1 12    Manual Intervention 10'       Shld /GH Mobs       Post Cap mobs 0'      Thoracic/Rib manipulation       CT MT/Mobs       PROM MT 10 min   End range flexion and IR   STM to     Bicep/ anterior arm          NMR re-education                                                                   Therapeutic Exercise and NMR EXR  [x] (84037) Provided verbal/tactile cueing for activities related to strengthening, flexibility, endurance, ROM  for improvements in scapular, scapulothoracic and UE control with self care, reaching, carrying, lifting, house/yardwork, driving/computer work.    [] (90978) Provided verbal/tactile cueing for activities related to improving balance, coordination, kinesthetic sense, posture, motor skill, proprioception  to assist with  scapular, scapulothoracic and UE control with self care, reaching, carrying, lifting, house/yardwork, driving/computer work. Therapeutic Activities:    [] (99278 or 38514) Provided verbal/tactile cueing for activities related to improving balance, coordination, kinesthetic sense, posture, motor skill, proprioception and motor activation to allow for proper function of scapular, scapulothoracic and UE control with self care, carrying, lifting, driving/computer work.      Home Exercise Program:    [x] (23093) Reviewed/Progressed HEP activities related to strengthening, flexibility, endurance, ROM of scapular, scapulothoracic and UE control with self care, reaching, carrying, lifting, house/yardwork, driving/computer work  [] (72718) Reviewed/Progressed HEP activities related to improving balance, coordination, kinesthetic sense, posture, motor skill, proprioception of scapular, scapulothoracic and UE control with self care, reaching, carrying, lifting, house/yardwork, driving/computer work      Manual Treatments:  PROM / STM / Oscillations-Mobs:  G-I, II, III, IV (PA's, Inf., Post.)  [x] (64663) Provided manual therapy to mobilize soft tissue/joints of cervical/CT, scapular GHJ and UE for the purpose of modulating pain, promoting relaxation,  increasing ROM, reducing/eliminating soft tissue swelling/inflammation/restriction, improving soft tissue extensibility and allowing for proper ROM for normal function with self care, reaching, carrying, lifting, house/yardwork, driving/computer work    Modalities:      Charges:  Timed Code Treatment Minutes: 40'   Total Treatment Minutes: 40       [] EVAL (LOW) 09856 (typically 20 minutes face-to-face)  [] EVAL (MOD) 08475 (typically 30 minutes face-to-face)  [] EVAL (HIGH) 96387 (typically 45 minutes face-to-face)  [] RE-EVAL     [x] UH(49365) x   2  [] IONTO (13340)  [] NMR (01106) x     [] VASO (44109)  [x] Manual ([id number]) x1     [] Other:  [] TA (97915)x     [] Mech Traction (10282)  [] ES(attended) (60282)     [] ES (un) (33592): If [facility] Please Indicate Time In/Out  CPT Code Time in Time out                                     GOALS:    GOALS:  Patient stated goal: return to exercise  [] Progressing: [x] Met: [] Not Met: [] Adjusted    Therapist goals for Patient:   Short Term Goals: To be achieved in: 2 weeks  1. Independent in HEP and progression per patient tolerance, in order to prevent re-injury. [] Progressing: [x] Met: [] Not Met: [] Adjusted  2. Patient will have a decrease in pain to facilitate improvement in movement, function, and ADLs as indicated by Functional Deficits. [] Progressing: [x] Met: [] Not Met: [] Adjusted    Long Term Goals: To be achieved in: 12 weeks  1. Disability index score of 10% or less for the DASH to assist with reaching prior level of function. [x] Progressing: [] Met: [] Not Met: [] Adjusted  2.  Patient will demonstrate increased AROM to [facility] to allow for proper joint functioning as indicated by patients Functional Deficits. [] Progressing: [x] Met: [] Not Met: [] Adjusted  3. Patient will demonstrate an increase in Strength to [facility] to allow for proper functional mobility as indicated by patients Functional Deficits. [x] Progressing: [] Met: [] Not Met: [] Adjusted  4. Patient will return to exercising functional activities without increased symptoms or restriction. [x] Progressing: [] Met: [] Not Met: [] Adjusted  5. No pain with sleeping(patient specific functional goal)    [] Progressing: [x] Met: [] Not Met: [] Adjusted     Progression Towards Functional goals:  [x] Patient is progressing as expected towards functional goals listed. [] Progression is slowed due to complexities listed. [] Progression has been slowed due to co-morbidities. [] Plan just implemented, too soon to assess goals progression  [] Other:     ASSESSMENT:  Pt had excellent  AROM and is symmetrical except IR which is approx 90% of normal. Strength is slowly improving . Continue to progress compressive strength prior to loading OH activity. Return to Play: (if applicable)   []  Stage 1: Intro to Strength   []  Stage 2: Dynamic Strength and Intro to Plyometrics   []  Stage 3: Advanced Plyometrics and Intro to Throwing   []  Stage 4: Sport specific Training/Return to Sport     []  Ready to Return to Play, [employer]   []  Not Ready for Return to Sports   Comments:      Treatment/Activity Tolerance:  [x] Patient tolerated treatment well [] Patient limited by fatique  [] Patient limited by pain  [] Patient limited by other medical complications  [] Other:     Overall Progression Towards Functional goals/ Treatment Progress Update:  [x] Patient is progressing as expected towards functional goals listed. [] Progression is slowed due to complexities/Impairments listed. [] Progression has been slowed due to co-morbidities.   [] Plan just implemented, too soon to assess goals progression <30days   [] Goals

## 2020-09-21 ENCOUNTER — OFFICE VISIT (OUTPATIENT)
Dept: ORTHOPEDIC SURGERY | Age: 50
End: 2020-09-21

## 2020-09-21 VITALS — TEMPERATURE: 98.2 F | BODY MASS INDEX: 21.86 KG/M2 | HEIGHT: 66 IN | WEIGHT: 136 LBS

## 2020-09-21 PROCEDURE — 99024 POSTOP FOLLOW-UP VISIT: CPT | Performed by: ORTHOPAEDIC SURGERY

## 2020-09-28 ENCOUNTER — HOSPITAL ENCOUNTER (OUTPATIENT)
Dept: PHYSICAL THERAPY | Age: 50
Setting detail: THERAPIES SERIES
Discharge: HOME | End: 2020-09-28
Payer: COMMERCIAL

## 2020-09-28 NOTE — FLOWSHEET NOTE
[facility]    Physical Therapy  Cancellation/No-show Note  Patient Name:  Fouzia Eduardo  :  1970   Date:  2020  Cancelled visits to date: 0  No-shows to date: 1    For today's appointment patient:  []  Cancelled  []  Rescheduled appointment  [x]  No-show     Reason given by patient:  []  Patient ill  []  Conflicting appointment  []  No transportation    []  Conflict with work  []  No reason given  []  Other:     Comments:      Phone call information:   []  Phone call made today to patient at _ time at number provided:      []  Patient answered, conversation as follows:    []  Patient did not answer, message left as follows:  []  Phone call not made today  []  Phone call not needed - pt contacted us to cancel and provided reason for cancellation.      Electronically signed by:  Dex Moulton PT

## 2021-06-28 NOTE — PROGRESS NOTES
9/21/20  History of Present of Illness:  S/P Rotator Cuff Repair  The patient returns today for right shoulder evaluation 3 months after 6/23/2020 rotator cuff repair    Examination:  Inspection reveals warm, dry, intact skin. There is no adenopathy. The distal neurovascular exam is grossly intact. Examination of the contralateral shoulder reveals no atrophy or deformity. The skin is warm and dry. Range of motion is within normal limits. There is no focal tenderness with palpation. Provocative SLAP, biceps tension, apprehension AC joint or rotator cuff tests are negative. Strength is graded 5/5 in all muscle groups outside of the rotator cuff. Rotator cuff strength is excellent. The distal neurovascular exam is grossly intact. Cervical spine: The skin is warm and dry. There is no swelling, warmth, or erythema. Range of motion is within normal limits. There is no paraspinal or spinous process tenderness. Spurling's sign is negative and did not produce shoulder pain. The distal neurovascular exam is grossly intact. Diagnostic Test Findings:    No orders of the defined types were placed in this encounter. Treatment Plan:  She is wanting to get back to weight training which I think is fine just in moderation light weights high reps increase that she feels comfortable and then I will see her back if she has any concerns or problems      Scotty Sanders. BOSTON Castelan and Sports Medicine  Sports Fellowship Trained  Board Certified  [facility] [facility] [employer] Team Physician    Disclaimer: \"This note was dictated with voice recognition software. Though review and correction are routine, we apologize for any errors. \" Length To Time In Minutes Device Was In Place: 10

## (undated) DEVICE — HYPODERMIC SAFETY NEEDLE: Brand: MAGELLAN

## (undated) DEVICE — WEREWOLF FLOW 90 COBLATION WAND: Brand: COBLATION

## (undated) DEVICE — 3M™ STERI-DRAPE™ U-DRAPE 1015: Brand: STERI-DRAPE™

## (undated) DEVICE — GLOVE SURG SZ 9 THK91MIL LTX FREE SYN POLYISOPRENE ANTI

## (undated) DEVICE — SLING ARM L ABV 13IN DE-ROTATION STRP HOOKS PROVIDE IMMOB

## (undated) DEVICE — DYONICS 25 DAY TUBE SET MUST BE                                    USED WITH 7211008, 3 PER BOX

## (undated) DEVICE — SYRINGE, LUER LOCK, 60ML: Brand: MEDLINE

## (undated) DEVICE — T-MAX DISPOSABLE FACE MASK 8 PER BOX

## (undated) DEVICE — MASC TURNOVER KIT: Brand: MEDLINE INDUSTRIES, INC.

## (undated) DEVICE — GAUZE,SPONGE,4"X4",8PLY,STRL,LF,10/TRAY: Brand: MEDLINE

## (undated) DEVICE — CANNULA THREADED FLEX 8.0 X 72MM: Brand: CLEAR-TRAC

## (undated) DEVICE — SHOULDER STABILIZATION KIT,                                    DISPOSABLE 12 PER BOX

## (undated) DEVICE — FIRSTPASS ST SUTURE PASSER, STANDARD: Brand: FIRSTPASS

## (undated) DEVICE — Device

## (undated) DEVICE — INCISOR PLUS PLATINUM 4.5 MM BLADE: Brand: DYONICS INCISOR

## (undated) DEVICE — BANDAGE ADH W1XL3IN NAT FAB WVN FLX DURABLE N ADH PD SEAL

## (undated) DEVICE — BLANKET WRM W40.2XL55.9IN IORT LO BODY + MISTRAL AIR

## (undated) DEVICE — 5.5 MM ELITE ACROMIOBLASTER                                    STRAIGHT DISPOSABLE BURRS, BRICK                                    RED, 10000 MAXIMUM RPM, PACKAGED 6                                    PER BOX, STERILE

## (undated) DEVICE — BOWL MED L 32OZ PLAS W/ MOLD GRAD EZ OPN PEEL PCH

## (undated) DEVICE — DYONICS 25 PATIENT TUBE SET MUST                                    BE USED WITH 7211007, 12 PER BOX

## (undated) DEVICE — 6 ML SYRINGE LUER-LOCK TIP: Brand: MONOJECT

## (undated) DEVICE — GLOVE ORANGE PI 8 1/2   MSG9085

## (undated) DEVICE — MAT FLR ABS DISP

## (undated) DEVICE — PACK PROCEDURE SURG SHLDR MFFOP CUST

## (undated) DEVICE — SUTURE NONABSORBABLE MONOFILAMENT 4-0 PS-2 18 IN BLK ETHILON 1667G